# Patient Record
Sex: MALE | Race: WHITE | NOT HISPANIC OR LATINO | Employment: OTHER | URBAN - METROPOLITAN AREA
[De-identification: names, ages, dates, MRNs, and addresses within clinical notes are randomized per-mention and may not be internally consistent; named-entity substitution may affect disease eponyms.]

---

## 2020-10-08 ENCOUNTER — HOSPITAL ENCOUNTER (INPATIENT)
Facility: HOSPITAL | Age: 80
LOS: 7 days | Discharge: NURSING, REHAB OR LONG-TERM CARE | DRG: 177 | End: 2020-10-15
Attending: HOSPITALIST | Admitting: HOSPITALIST
Payer: MEDICARE

## 2020-10-08 DIAGNOSIS — U07.1 COVID-19: ICD-10-CM

## 2020-10-08 DIAGNOSIS — I50.9 CHF (CONGESTIVE HEART FAILURE): ICD-10-CM

## 2020-10-08 DIAGNOSIS — U07.1 COVID-19 VIRUS INFECTION: ICD-10-CM

## 2020-10-08 PROBLEM — J44.9 COPD (CHRONIC OBSTRUCTIVE PULMONARY DISEASE): Status: ACTIVE | Noted: 2020-10-08

## 2020-10-08 PROBLEM — I10 HTN (HYPERTENSION): Status: ACTIVE | Noted: 2020-10-08

## 2020-10-08 PROBLEM — E11.9 TYPE 2 DIABETES MELLITUS: Status: ACTIVE | Noted: 2020-10-08

## 2020-10-08 LAB
ALBUMIN SERPL BCP-MCNC: 2.8 G/DL (ref 3.5–5.2)
ALP SERPL-CCNC: 46 U/L (ref 55–135)
ALT SERPL W/O P-5'-P-CCNC: 16 U/L (ref 10–44)
ANION GAP SERPL CALC-SCNC: 8 MMOL/L (ref 8–16)
ANISOCYTOSIS BLD QL SMEAR: SLIGHT
AST SERPL-CCNC: 26 U/L (ref 10–40)
AV INDEX (PROSTH): 1.13
AV MEAN GRADIENT: 3 MMHG
AV PEAK GRADIENT: 5 MMHG
AV VALVE AREA: 4.88 CM2
AV VELOCITY RATIO: 0.92
BASOPHILS NFR BLD: 0 % (ref 0–1.9)
BILIRUB SERPL-MCNC: 0.2 MG/DL (ref 0.1–1)
BNP SERPL-MCNC: 134 PG/ML (ref 0–99)
BSA FOR ECHO PROCEDURE: 2.26 M2
BUN SERPL-MCNC: 24 MG/DL (ref 8–23)
CALCIUM SERPL-MCNC: 7.7 MG/DL (ref 8.7–10.5)
CHLORIDE SERPL-SCNC: 101 MMOL/L (ref 95–110)
CK SERPL-CCNC: 105 U/L (ref 20–200)
CO2 SERPL-SCNC: 29 MMOL/L (ref 23–29)
CREAT SERPL-MCNC: 1 MG/DL (ref 0.5–1.4)
CRP SERPL-MCNC: 71.4 MG/L (ref 0–8.2)
CV ECHO LV RWT: 0.26 CM
D DIMER PPP IA.FEU-MCNC: 0.76 MG/L FEU
DIFFERENTIAL METHOD: ABNORMAL
DOP CALC AO PEAK VEL: 1.11 M/S
DOP CALC AO VTI: 21.04 CM
DOP CALC LVOT AREA: 4.3 CM2
DOP CALC LVOT DIAMETER: 2.35 CM
DOP CALC LVOT PEAK VEL: 1.02 M/S
DOP CALC LVOT STROKE VOLUME: 102.7 CM3
DOP CALCLVOT PEAK VEL VTI: 23.69 CM
E WAVE DECELERATION TIME: 182.38 MSEC
E/A RATIO: 0.98
E/E' RATIO: 9.78 M/S
ECHO LV POSTERIOR WALL: 0.73 CM (ref 0.6–1.1)
EOSINOPHIL NFR BLD: 0 % (ref 0–8)
ERYTHROCYTE [DISTWIDTH] IN BLOOD BY AUTOMATED COUNT: 15.3 % (ref 11.5–14.5)
ERYTHROCYTE [SEDIMENTATION RATE] IN BLOOD BY WESTERGREN METHOD: 77 MM/HR (ref 0–23)
EST. GFR  (AFRICAN AMERICAN): >60 ML/MIN/1.73 M^2
EST. GFR  (NON AFRICAN AMERICAN): >60 ML/MIN/1.73 M^2
ESTIMATED AVG GLUCOSE: 131 MG/DL (ref 68–131)
FERRITIN SERPL-MCNC: 241 NG/ML (ref 20–300)
FRACTIONAL SHORTENING: 34 % (ref 28–44)
GLUCOSE SERPL-MCNC: 116 MG/DL (ref 70–110)
HBA1C MFR BLD HPLC: 6.2 % (ref 4–5.6)
HCT VFR BLD AUTO: 43.9 % (ref 40–54)
HGB BLD-MCNC: 13.4 G/DL (ref 14–18)
IMM GRANULOCYTES # BLD AUTO: ABNORMAL K/UL (ref 0–0.04)
IMM GRANULOCYTES NFR BLD AUTO: ABNORMAL % (ref 0–0.5)
INTERVENTRICULAR SEPTUM: 0.73 CM (ref 0.6–1.1)
LA MAJOR: 4.9 CM
LA MINOR: 5.37 CM
LA WIDTH: 3.52 CM
LACTATE SERPL-SCNC: 0.9 MMOL/L (ref 0.5–2.2)
LDH SERPL L TO P-CCNC: 217 U/L (ref 110–260)
LEFT ATRIUM SIZE: 2.74 CM
LEFT ATRIUM VOLUME INDEX: 19.2 ML/M2
LEFT ATRIUM VOLUME: 42.01 CM3
LEFT INTERNAL DIMENSION IN SYSTOLE: 3.75 CM (ref 2.1–4)
LEFT VENTRICLE DIASTOLIC VOLUME INDEX: 73.68 ML/M2
LEFT VENTRICLE DIASTOLIC VOLUME: 160.98 ML
LEFT VENTRICLE MASS INDEX: 70 G/M2
LEFT VENTRICLE SYSTOLIC VOLUME INDEX: 27.5 ML/M2
LEFT VENTRICLE SYSTOLIC VOLUME: 60.05 ML
LEFT VENTRICULAR INTERNAL DIMENSION IN DIASTOLE: 5.71 CM (ref 3.5–6)
LEFT VENTRICULAR MASS: 152.38 G
LV LATERAL E/E' RATIO: 8 M/S
LV SEPTAL E/E' RATIO: 12.57 M/S
LYMPHOCYTES NFR BLD: 10 % (ref 18–48)
MAGNESIUM SERPL-MCNC: 1.9 MG/DL (ref 1.6–2.6)
MCH RBC QN AUTO: 28.2 PG (ref 27–31)
MCHC RBC AUTO-ENTMCNC: 30.5 G/DL (ref 32–36)
MCV RBC AUTO: 92 FL (ref 82–98)
METAMYELOCYTES NFR BLD MANUAL: 4 %
MONOCYTES NFR BLD: 4 % (ref 4–15)
MV PEAK A VEL: 0.9 M/S
MV PEAK E VEL: 0.88 M/S
MV STENOSIS PRESSURE HALF TIME: 52.89 MS
MV VALVE AREA P 1/2 METHOD: 4.16 CM2
MYELOCYTES NFR BLD MANUAL: 5 %
NEUTROPHILS NFR BLD: 69 % (ref 38–73)
NEUTS BAND NFR BLD MANUAL: 7 %
NRBC BLD-RTO: 0 /100 WBC
OVALOCYTES BLD QL SMEAR: ABNORMAL
PHOSPHATE SERPL-MCNC: 1.4 MG/DL (ref 2.7–4.5)
PLATELET # BLD AUTO: 178 K/UL (ref 150–350)
PMV BLD AUTO: 9.6 FL (ref 9.2–12.9)
POCT GLUCOSE: 102 MG/DL (ref 70–110)
POCT GLUCOSE: 108 MG/DL (ref 70–110)
POCT GLUCOSE: 126 MG/DL (ref 70–110)
POCT GLUCOSE: 178 MG/DL (ref 70–110)
POCT GLUCOSE: 281 MG/DL (ref 70–110)
POIKILOCYTOSIS BLD QL SMEAR: SLIGHT
POLYCHROMASIA BLD QL SMEAR: ABNORMAL
POTASSIUM SERPL-SCNC: 4.7 MMOL/L (ref 3.5–5.1)
PROCALCITONIN SERPL IA-MCNC: 1.61 NG/ML
PROMYELOCYTES NFR BLD MANUAL: 1 %
PROT SERPL-MCNC: 6.6 G/DL (ref 6–8.4)
RA MAJOR: 4.6 CM
RA PRESSURE: 8 MMHG
RA WIDTH: 3.83 CM
RBC # BLD AUTO: 4.76 M/UL (ref 4.6–6.2)
RIGHT VENTRICULAR END-DIASTOLIC DIMENSION: 3.8 CM
RV TISSUE DOPPLER FREE WALL SYSTOLIC VELOCITY 1 (APICAL 4 CHAMBER VIEW): 11.03 CM/S
SINUS: 3.79 CM
SODIUM SERPL-SCNC: 138 MMOL/L (ref 136–145)
STJ: 3.36 CM
TDI LATERAL: 0.11 M/S
TDI SEPTAL: 0.07 M/S
TDI: 0.09 M/S
TRICUSPID ANNULAR PLANE SYSTOLIC EXCURSION: 2.31 CM
TROPONIN I SERPL DL<=0.01 NG/ML-MCNC: 0.02 NG/ML (ref 0–0.03)
WBC # BLD AUTO: 7.04 K/UL (ref 3.9–12.7)

## 2020-10-08 PROCEDURE — 99900035 HC TECH TIME PER 15 MIN (STAT)

## 2020-10-08 PROCEDURE — 94640 AIRWAY INHALATION TREATMENT: CPT

## 2020-10-08 PROCEDURE — 83605 ASSAY OF LACTIC ACID: CPT

## 2020-10-08 PROCEDURE — 85652 RBC SED RATE AUTOMATED: CPT

## 2020-10-08 PROCEDURE — 83036 HEMOGLOBIN GLYCOSYLATED A1C: CPT

## 2020-10-08 PROCEDURE — 86140 C-REACTIVE PROTEIN: CPT

## 2020-10-08 PROCEDURE — 87040 BLOOD CULTURE FOR BACTERIA: CPT

## 2020-10-08 PROCEDURE — 97535 SELF CARE MNGMENT TRAINING: CPT

## 2020-10-08 PROCEDURE — 82550 ASSAY OF CK (CPK): CPT

## 2020-10-08 PROCEDURE — 93010 EKG 12-LEAD: ICD-10-PCS | Mod: ,,, | Performed by: INTERNAL MEDICINE

## 2020-10-08 PROCEDURE — 27000221 HC OXYGEN, UP TO 24 HOURS

## 2020-10-08 PROCEDURE — 36415 COLL VENOUS BLD VENIPUNCTURE: CPT

## 2020-10-08 PROCEDURE — 94664 DEMO&/EVAL PT USE INHALER: CPT

## 2020-10-08 PROCEDURE — 27000646 HC AEROBIKA DEVICE

## 2020-10-08 PROCEDURE — 84484 ASSAY OF TROPONIN QUANT: CPT

## 2020-10-08 PROCEDURE — 83880 ASSAY OF NATRIURETIC PEPTIDE: CPT

## 2020-10-08 PROCEDURE — 93010 ELECTROCARDIOGRAM REPORT: CPT | Mod: ,,, | Performed by: INTERNAL MEDICINE

## 2020-10-08 PROCEDURE — 63600175 PHARM REV CODE 636 W HCPCS: Performed by: STUDENT IN AN ORGANIZED HEALTH CARE EDUCATION/TRAINING PROGRAM

## 2020-10-08 PROCEDURE — 83615 LACTATE (LD) (LDH) ENZYME: CPT

## 2020-10-08 PROCEDURE — 99223 1ST HOSP IP/OBS HIGH 75: CPT | Mod: AI,GC,, | Performed by: INTERNAL MEDICINE

## 2020-10-08 PROCEDURE — 83735 ASSAY OF MAGNESIUM: CPT

## 2020-10-08 PROCEDURE — 97165 OT EVAL LOW COMPLEX 30 MIN: CPT

## 2020-10-08 PROCEDURE — 97116 GAIT TRAINING THERAPY: CPT

## 2020-10-08 PROCEDURE — 85379 FIBRIN DEGRADATION QUANT: CPT

## 2020-10-08 PROCEDURE — 85027 COMPLETE CBC AUTOMATED: CPT

## 2020-10-08 PROCEDURE — 25000003 PHARM REV CODE 250: Performed by: INTERNAL MEDICINE

## 2020-10-08 PROCEDURE — 20600001 HC STEP DOWN PRIVATE ROOM

## 2020-10-08 PROCEDURE — 85007 BL SMEAR W/DIFF WBC COUNT: CPT

## 2020-10-08 PROCEDURE — 97161 PT EVAL LOW COMPLEX 20 MIN: CPT

## 2020-10-08 PROCEDURE — 82728 ASSAY OF FERRITIN: CPT

## 2020-10-08 PROCEDURE — 84145 PROCALCITONIN (PCT): CPT

## 2020-10-08 PROCEDURE — 80053 COMPREHEN METABOLIC PANEL: CPT

## 2020-10-08 PROCEDURE — 99223 PR INITIAL HOSPITAL CARE,LEVL III: ICD-10-PCS | Mod: AI,GC,, | Performed by: INTERNAL MEDICINE

## 2020-10-08 PROCEDURE — 25000242 PHARM REV CODE 250 ALT 637 W/ HCPCS: Performed by: HOSPITALIST

## 2020-10-08 PROCEDURE — 92610 EVALUATE SWALLOWING FUNCTION: CPT

## 2020-10-08 PROCEDURE — 93005 ELECTROCARDIOGRAM TRACING: CPT

## 2020-10-08 PROCEDURE — 94799 UNLISTED PULMONARY SVC/PX: CPT

## 2020-10-08 PROCEDURE — 94761 N-INVAS EAR/PLS OXIMETRY MLT: CPT

## 2020-10-08 PROCEDURE — 84100 ASSAY OF PHOSPHORUS: CPT

## 2020-10-08 PROCEDURE — 25000003 PHARM REV CODE 250: Performed by: STUDENT IN AN ORGANIZED HEALTH CARE EDUCATION/TRAINING PROGRAM

## 2020-10-08 RX ORDER — GLUCAGON 1 MG
1 KIT INJECTION
Status: DISCONTINUED | OUTPATIENT
Start: 2020-10-08 | End: 2020-10-15 | Stop reason: HOSPADM

## 2020-10-08 RX ORDER — IPRATROPIUM BROMIDE AND ALBUTEROL SULFATE 2.5; .5 MG/3ML; MG/3ML
3 SOLUTION RESPIRATORY (INHALATION) EVERY 6 HOURS
Status: DISCONTINUED | OUTPATIENT
Start: 2020-10-08 | End: 2020-10-15 | Stop reason: HOSPADM

## 2020-10-08 RX ORDER — METFORMIN HYDROCHLORIDE 1000 MG/1
1000 TABLET ORAL 2 TIMES DAILY WITH MEALS
COMMUNITY

## 2020-10-08 RX ORDER — LABETALOL HYDROCHLORIDE 5 MG/ML
10 INJECTION, SOLUTION INTRAVENOUS EVERY 4 HOURS PRN
Status: DISCONTINUED | OUTPATIENT
Start: 2020-10-08 | End: 2020-10-08

## 2020-10-08 RX ORDER — CEFTRIAXONE 1 G/1
1 INJECTION, POWDER, FOR SOLUTION INTRAMUSCULAR; INTRAVENOUS
Status: COMPLETED | OUTPATIENT
Start: 2020-10-09 | End: 2020-10-11

## 2020-10-08 RX ORDER — IBUPROFEN 200 MG
16 TABLET ORAL
Status: DISCONTINUED | OUTPATIENT
Start: 2020-10-08 | End: 2020-10-15 | Stop reason: HOSPADM

## 2020-10-08 RX ORDER — SODIUM CHLORIDE 0.9 % (FLUSH) 0.9 %
10 SYRINGE (ML) INJECTION
Status: DISCONTINUED | OUTPATIENT
Start: 2020-10-08 | End: 2020-10-15 | Stop reason: HOSPADM

## 2020-10-08 RX ORDER — AZITHROMYCIN 250 MG/1
250 TABLET, FILM COATED ORAL DAILY
Status: DISCONTINUED | OUTPATIENT
Start: 2020-10-08 | End: 2020-10-08

## 2020-10-08 RX ORDER — LABETALOL HCL 20 MG/4 ML
10 SYRINGE (ML) INTRAVENOUS EVERY 4 HOURS PRN
Status: DISCONTINUED | OUTPATIENT
Start: 2020-10-08 | End: 2020-10-15 | Stop reason: HOSPADM

## 2020-10-08 RX ORDER — AZITHROMYCIN 250 MG/1
500 TABLET, FILM COATED ORAL ONCE
Status: DISCONTINUED | OUTPATIENT
Start: 2020-10-08 | End: 2020-10-08

## 2020-10-08 RX ORDER — IBUPROFEN 200 MG
24 TABLET ORAL
Status: DISCONTINUED | OUTPATIENT
Start: 2020-10-08 | End: 2020-10-15 | Stop reason: HOSPADM

## 2020-10-08 RX ORDER — PANTOPRAZOLE SODIUM 40 MG/1
40 TABLET, DELAYED RELEASE ORAL DAILY
COMMUNITY

## 2020-10-08 RX ORDER — AZITHROMYCIN 250 MG/1
250 TABLET, FILM COATED ORAL DAILY
Status: COMPLETED | OUTPATIENT
Start: 2020-10-09 | End: 2020-10-12

## 2020-10-08 RX ORDER — PANTOPRAZOLE SODIUM 40 MG/1
40 TABLET, DELAYED RELEASE ORAL DAILY
Status: DISCONTINUED | OUTPATIENT
Start: 2020-10-09 | End: 2020-10-15 | Stop reason: HOSPADM

## 2020-10-08 RX ORDER — ENOXAPARIN SODIUM 100 MG/ML
40 INJECTION SUBCUTANEOUS EVERY 24 HOURS
Status: DISCONTINUED | OUTPATIENT
Start: 2020-10-08 | End: 2020-10-15 | Stop reason: HOSPADM

## 2020-10-08 RX ORDER — HYDROCHLOROTHIAZIDE 25 MG/1
25 TABLET ORAL DAILY
Status: ON HOLD | COMMUNITY
End: 2020-10-15 | Stop reason: SDUPTHER

## 2020-10-08 RX ORDER — PANTOPRAZOLE SODIUM 40 MG/1
40 TABLET, DELAYED RELEASE ORAL DAILY
Status: DISCONTINUED | OUTPATIENT
Start: 2020-10-08 | End: 2020-10-08

## 2020-10-08 RX ORDER — AZITHROMYCIN 250 MG/1
250 TABLET, FILM COATED ORAL DAILY
Status: DISCONTINUED | OUTPATIENT
Start: 2020-10-09 | End: 2020-10-08

## 2020-10-08 RX ORDER — INSULIN ASPART 100 [IU]/ML
0-5 INJECTION, SOLUTION INTRAVENOUS; SUBCUTANEOUS
Status: DISCONTINUED | OUTPATIENT
Start: 2020-10-08 | End: 2020-10-15 | Stop reason: HOSPADM

## 2020-10-08 RX ORDER — CEFTRIAXONE 1 G/1
1 INJECTION, POWDER, FOR SOLUTION INTRAMUSCULAR; INTRAVENOUS
Status: COMPLETED | OUTPATIENT
Start: 2020-10-08 | End: 2020-10-08

## 2020-10-08 RX ORDER — ASPIRIN 81 MG/1
81 TABLET ORAL DAILY
COMMUNITY

## 2020-10-08 RX ADMIN — SODIUM PHOSPHATE, MONOBASIC, MONOHYDRATE AND SODIUM PHOSPHATE, DIBASIC, ANHYDROUS 30 MMOL: 276; 142 INJECTION, SOLUTION INTRAVENOUS at 09:10

## 2020-10-08 RX ADMIN — REMDESIVIR 100 MG: 100 INJECTION, POWDER, LYOPHILIZED, FOR SOLUTION INTRAVENOUS at 04:10

## 2020-10-08 RX ADMIN — INSULIN ASPART 3 UNITS: 100 INJECTION, SOLUTION INTRAVENOUS; SUBCUTANEOUS at 12:10

## 2020-10-08 RX ADMIN — Medication 10 MG: at 10:10

## 2020-10-08 RX ADMIN — CEFTRIAXONE SODIUM 1 G: 1 INJECTION, POWDER, FOR SOLUTION INTRAMUSCULAR; INTRAVENOUS at 11:10

## 2020-10-08 RX ADMIN — IPRATROPIUM BROMIDE AND ALBUTEROL SULFATE 3 ML: .5; 2.5 SOLUTION RESPIRATORY (INHALATION) at 07:10

## 2020-10-08 RX ADMIN — IPRATROPIUM BROMIDE AND ALBUTEROL SULFATE 3 ML: .5; 2.5 SOLUTION RESPIRATORY (INHALATION) at 12:10

## 2020-10-08 RX ADMIN — ENOXAPARIN SODIUM 40 MG: 40 INJECTION SUBCUTANEOUS at 04:10

## 2020-10-08 RX ADMIN — DEXAMETHASONE SODIUM PHOSPHATE 6 MG: 4 INJECTION, SOLUTION INTRAMUSCULAR; INTRAVENOUS at 10:10

## 2020-10-08 RX ADMIN — CEFTRIAXONE SODIUM 1 G: 1 INJECTION, POWDER, FOR SOLUTION INTRAMUSCULAR; INTRAVENOUS at 07:10

## 2020-10-08 RX ADMIN — AZITHROMYCIN MONOHYDRATE 500 MG: 500 INJECTION, POWDER, LYOPHILIZED, FOR SOLUTION INTRAVENOUS at 09:10

## 2020-10-08 NOTE — PT/OT/SLP EVAL
Speech Language Pathology Evaluation  Bedside Swallow    Patient Name:  Femi Beck   MRN:  [id number]  Admitting Diagnosis: <principal problem not specified>    Recommendations:                 General Recommendations:  Dysphagia therapy  Diet recommendations:  Mechanical soft, Thin   Aspiration Precautions: 1 bite/sip at a time, Meds whole 1 at a time, Small bites/sips and Standard aspiration precautions   General Precautions: Standard, fall  Communication strategies:  Pt hard of hearing     History:     Past Medical History:   Diagnosis Date    COPD (chronic obstructive pulmonary disease)     Diabetes mellitus     Diverticulosis     GI bleed     associated w/ diverticulosis    Hypertension        Past Surgical History:   Procedure Laterality Date    CATARACT EXTRACTION         Modified Barium Swallow: none prior     Subjective     Pt awake and seated upright in bed      Pain/Comfort:  · Pain Rating 1: 0/10  · Pain Rating Post-Intervention 1: 0/10    Objective:     Oral Musculature Evaluation  · Oral Musculature: WFL  · Dentition: edentulous  · Secretion Management: adequate  · Oral Labial Strength and Mobility: WFL  · Lingual Strength and Mobility: WFL  · Volitional Cough: weak  · Volitional Swallow: difficult to elicit  · Voice Prior to PO Intake: hoarse but clear    Bedside Swallow Eval:   Consistencies Assessed:  · Thin liquids small single sips across 3oz, sonceutive sips from open cup and straw   · Puree 4oz via full tsp  · Solids x3     Oral Phase:   · WFL   · Pt very impulsive eating style taking large bites/sips, difficult to regulate given significantly hard of hearing     Pharyngeal Phase:   · coughing/choking  · throat clearing following large consecutive sips of thin liquids   · With return to small single sips no overt clinical signs of aspiration appreciated     Compensatory Strategies  · None      Treatment: Pt significantly hard of hearing at baseline. SLP attempted multiple times to  education pt re: diet recommendations and aspiration precautions. No true learning appreciated and ongoing reinforcement warranted. Whiteboard current     Assessment:     Femi Beck is a 79 y.o. male with an SLP diagnosis of Dysphagia.  Goals:   Multidisciplinary Problems     SLP Goals        Problem: SLP Goal    Goal Priority Disciplines Outcome   SLP Goal     SLP    Description: Speech Language Pathology Goals  Goals expected to be met by 10/15    1. Pt will tolerate diet of mechanical soft solids and thin liquids without overt clinical signs of aspiration                      Plan:     · Patient to be seen:  4 x/week   · Plan of Care expires:     · Plan of Care reviewed with:  patient   · SLP Follow-Up:  Yes       Discharge recommendations:  nursing facility, skilled   Barriers to Discharge:  None    Time Tracking:     SLP Treatment Date:   10/08/20  Speech Start Time:  1101  Speech Stop Time:  1117     Speech Total Time (min):  16 min    Billable Minutes: Niall Solomon  and Carin Care/Home Management Training 8    Rachel Angulo CCC-SLP  10/08/2020            Principal Discharge DX:	Generalized abdominal pain

## 2020-10-08 NOTE — PLAN OF CARE
Pt seen for clinical swallow assessment appearing safe for MS/thin liquids     Rachel Angulo MS, CCC-SLP  Speech Language Pathologist  Pager: [phone]  Date 10/8/2020

## 2020-10-08 NOTE — NURSING
Pt arrives to unit via [facility] from [facility]. Pt arrives with venti mask @ 15 L. Pt short of breath at rest and with exertion. Pt denies fever/chills. Pt unable to speak in full and complete sentences without becoming short of breath. Work of breathing unlabored at rest. Labored with movement and with movement accessory muscle use. Pts abdomen is rounded / distended with edema + 1 noted. +1 edema noted to bilateral upper and lower extremities. Pt with generalized weakness and requires assistance to turn and reposition. Pt alert and oriented and is able to answer name, date of birth, time, and place without difficulty.  Pt with decreased hearing to R and L ear. Pt with decreased vision. Condom cath in  Place. Assessment done per flowsheet. NAD noted. Awaiting MD orders/ disposition. Bed rails up x 2 with bed locked in lowest position. Call light in reach. Will continue to monitor. ADMIT evaluation continues.

## 2020-10-08 NOTE — PT/OT/SLP EVAL
"Occupational Therapy   Evaluation    Name: Femi Beck  MRN: [id number]  Admitting Diagnosis:  COVID    Recommendations:     Discharge Recommendations: nursing facility, skilled  Discharge Equipment Recommendations:  (TBD)  Barriers to discharge:  (Unknown)    Assessment:     Femi Beck is a 79 y.o. male with a medical diagnosis of COVID.  He presents as a transfer from [place] [address]. Performance deficits affecting function: weakness, impaired functional mobilty, impaired cognition, decreased safety awareness, impaired cardiopulmonary response to activity, impaired endurance, gait instability, impaired balance, impaired self care skills.      Rehab Prognosis: Good; patient would benefit from acute skilled OT services to address these deficits and reach maximum level of function.       Plan:     Patient to be seen 3 x/week to address the above listed problems via self-care/home management, therapeutic activities, therapeutic exercises  · Plan of Care Expires: 11/07/20  · Plan of Care Reviewed with: patient    Subjective     Chief Complaint: "I only got here 5 hours ago."  Patient/Family Comments/goals: to get better and go home    Occupational Profile:  Per CM note:  Mr Beck Lives in a single family mobile home with his son and grandson. There are 2 steps to porch and point of entry. Prior to this hospitalization Mr Beck was independent with ADLs / drove / DOES NOT use DME or other in home assistive equipment.    Pain/Comfort:  · Pain Rating 1: 0/10  · Pain Rating Post-Intervention 1: 0/10    Patients cultural, spiritual, Hinduism conflicts given the current situation: no    Objective:     Communicated with: RN prior to session.  Patient found supine with pulse ox (continuous), telemetry, peripheral IV, oxygen(10L HFNC) upon OT entry to room.    General Precautions: Standard, airborne, contact, droplet, fall, hearing impaired   Orthopedic Precautions:    Braces:       Occupational " Performance:    Bed Mobility:    · Patient completed Supine to Sit with contact guard assistance  · Patient completed Sit to Supine with contact guard assistance    Functional Mobility/Transfers:  · Patient completed Sit <> Stand Transfer with contact guard assistance  with  hand-held assist   · Functional Mobility: CGA around room with B HHA    Activities of Daily Living:  · Feeding:  minimum assistance    · Grooming: minimum assistance    · Upper Body Dressing: moderate assistance    · Lower Body Dressing: maximal assistance      Cognitive/Visual Perceptual:  Pt is alert and following commands via gesturing and when pt is able to understand therapist's command (pt is very [place])  Difficult to assess orientation 2* [place], but RN reports pt was A&Ox3    Physical Exam:  Postural examination/scapula alignment:    -       No postural abnormalities identified  Sensation:    -       Intact  Upper Extremity Range of Motion:     -       Right Upper Extremity: WFL  -       Left Upper Extremity: WFL  Upper Extremity Strength:    -       Right Upper Extremity: WFL  -       Left Upper Extremity: WFL   Strength:    -       Right Upper Extremity: WFL  -       Left Upper Extremity: WFL  Fine Motor Coordination:    -       Intact  Gross motor coordination:   WFL    AMPAC 6 Click ADL:  AMPAC Total Score: 14    Treatment & Education:  Pt ed on OT POC  Pt ed on safety with transfers and ADL performance with need for assistance to complete  Pt ed on ROM ex's 3x daily for increased overall strength and endurance  Education:  RN requested return to bed    Patient left with bed in chair position with all lines intact, call button in reach and RN notified    GOALS:   Multidisciplinary Problems     Occupational Therapy Goals        Problem: Occupational Therapy Goal    Goal Priority Disciplines Outcome Interventions   Occupational Therapy Goal     OT, PT/OT Ongoing, Progressing    Description: Goals to be met by: 10/22/20     Patient will  increase functional independence with ADLs by performing:    Feeding with Modified [place].  UE Dressing with Supervision.  LE Dressing with Stand-by Assistance.  Grooming while standing at sink with Stand-by Assistance.  Toileting from toilet with Stand-by Assistance for hygiene and clothing management.   Toilet transfer to toilet with Stand-by Assistance.                     History:     Past Medical History:   Diagnosis Date    COPD (chronic obstructive pulmonary disease)     Diabetes mellitus     Diverticulosis     GI bleed     associated w/ diverticulosis    Hypertension        Past Surgical History:   Procedure Laterality Date    CATARACT EXTRACTION         Time Tracking:     OT Date of Treatment: 10/08/20  OT Start Time: 0846  OT Stop Time: 0906  OT Total Time (min): 20 min    Billable Minutes:Evaluation 10  Self Care/Home Management 10    BRYAN Hinoojsa  10/8/2020

## 2020-10-08 NOTE — PLAN OF CARE
No acute events during shift. Pt tolerated all activities well. Pt voiding with no difficulties per urinal. Pt worked with PT today and stood at the side of the bed. Pt participated with all therapies. Granddaughter was updated and number was placed in the chart. All questions answered. Will endorse care to NOC RN.

## 2020-10-08 NOTE — H&P
"[facility] ICU [facility] Medicine  History & Physical    Patient Name: Femi Beck  MRN: [id number]  Admission Date: 10/8/2020  Attending Physician: Andie Hernandez MD   Primary Care Provider: Primary Doctor No         Patient information was obtained from past medical records.     Subjective:     Principal Problem:<principal problem not specified>    Chief Complaint: No chief complaint on file.     HPI: Mr. Beck is an 79 year-old male with COPD, HTN, DM2, and extremely hard of hearing who is presenting to [facility] [place] a "[place]" transfer. Of note, the entire history was obtained from the patient's physical paper chart, as the patient is extremely hard of hearing. He presented to [facility] on 10/[facility] with encephalopathy and cough. On arrival there he was normotensive, tachycardic, mildly febrile (100.6), nontachypneic, and with O2 saturations at 88% on room air. He was placed on supplemental O2 for improvement. Labs were significant for a leukocytosis 14.8, an TASHA (creat 1.4, unclear baseline), hyponatremia 128, , lactate 2.0 which trended down to 1.5, trop 0.02, and elevated inflammatory markers (CRP 33, ferritin 129, procal 5.1, ). He tested negative for Influenza A+B. A CXR revealed basal interstitial changes and no evidence of consolidation. He was admitted to the ICU and started on cefepime and azithromycin for CAP, dexamethasone, remdesivir, and was additionally given convalescent plasma. Per records the AMS, leukocytosis, TASHA, and hyponatremia improved throughout his stay.     Past Medical History:   Diagnosis Date    COPD (chronic obstructive pulmonary disease)     Diabetes mellitus     Diverticulosis     GI bleed     associated w/ diverticulosis    Hypertension      Past Surgical History:   Procedure Laterality Date    CATARACT EXTRACTION       Review of patient's allergies indicates:  No Known Allergies    No current facility-administered " medications on file prior to encounter.      Current Outpatient Medications on File Prior to Encounter   Medication Sig    aspirin (ECOTRIN) 81 MG EC tablet Take 81 mg by mouth once daily.    hydroCHLOROthiazide (HYDRODIURIL) 25 MG tablet Take 25 mg by mouth once daily.    metFORMIN (GLUCOPHAGE) 1000 MG tablet Take 1,000 mg by mouth 2 (two) times daily with meals.    pantoprazole (PROTONIX) 40 MG tablet Take 40 mg by mouth once daily.     Family History     None        Tobacco Use    Smoking status: Not on file   Substance and Sexual Activity    Alcohol use: Not on file    Drug use: Not on file    Sexual activity: Not on file     Review of Systems   Reason unable to perform ROS: HARD OF HEARING.     Objective:     Vital Signs (Most Recent):  Temp: 98.5 °F (36.9 °C) (10/08/20 0413)  Pulse: 71 (10/08/20 0421)  Resp: 20 (10/08/20 0413)  BP: (!) 141/69 (10/08/20 0413)  SpO2: 96 % (10/08/20 0421) Vital Signs (24h Range):  Temp:  [98.5 °F (36.9 °C)] 98.5 °F (36.9 °C)  Pulse:  [69-82] 71  Resp:  [17-20] 20  SpO2:  [88 %-96 %] 96 %  BP: (116-141)/(63-69) 141/69        There is no height or weight on file to calculate BMI.    Physical Exam  Constitutional:       General: He is not in acute distress.     Appearance: Normal appearance. He is obese. He is not ill-appearing, toxic-appearing or diaphoretic.   HENT:      Head: Normocephalic and atraumatic.      Nose: No congestion.      Mouth/Throat:      Pharynx: No oropharyngeal exudate or posterior oropharyngeal erythema.   Eyes:      General: No scleral icterus.     Extraocular Movements: Extraocular movements intact.      Conjunctiva/sclera: Conjunctivae normal.      Pupils: Pupils are equal, round, and reactive to light.   Cardiovascular:      Rate and Rhythm: Normal rate and regular rhythm.   Pulmonary:      Effort: Pulmonary effort is normal.      Breath sounds: Wheezing (bilateral expiratory wheezes) present.      Comments: Sitting upright, speaking outloud in  clear and complete sentences, in no acute distress  Abdominal:      General: Abdomen is flat. Bowel sounds are normal. There is no distension.      Tenderness: There is no abdominal tenderness. There is no guarding.   Musculoskeletal:         General: Swelling (trace pedal edema extending to knees bilaterally) present.   Skin:     General: Skin is warm.      Findings: No bruising, erythema or rash.   Neurological:      General: No focal deficit present.      Mental Status: He is alert.      Comments: Unable to assess orientation because pt is hard of hearing and could not communicate with me  -However, he is moving all four extremities with adequate strength           CRANIAL NERVES     CN III, IV, VI   Pupils are equal, round, and reactive to light.    Significant Labs: labs from the outside facility (located on paper chart) reviewed. no labs drawn yet at [facility]     Significant Imaging: imaging reads (particularly CXR) from OSH reviewed. No formal imaging CDs or actual images upon transfer.     Assessment/Plan:     COVID-19 virus infection  Mr. Beck is an 79 year-old male with COPD, HTN, DM2 [place] [facility] [place] He presented 10/05 with AMS and cough and tested positive for COVID-19.   -He was septic (met 3/4 SIRS criteria) with hyponatremia (128), an elevated lactate (2.0 on admission which trended down to 1.4), and an TASHA (1.4 on admission, uncertain of baseline)     -CXR read from OSH records: low-grade nonspecific basal pulmonary interstitial changes. No consolidation, mass, pleural effusion, or pneumothorax.     -Per paper chart review, he was started on CAP coverage with cefepime and azithromycin. He additionally received convalescent plasma and was started on dexamethasone and remdesivir     Plan:  -Airborne and droplet precautions  -Ordered CXR-- if evidence of infiltrates can start on CAP coverage but would hold if no infiltrates are  present  -Start dexamethasone given O2 requirements  -Would qualify for remdesivir; pharm consult placed  -Follow cultures  -Ordered TTE in the setting of elevated BNP, follow results    HTN (hypertension)  On HCTZ at home.    Plan:  -Hold in the setting of sepsis  -Resume when clinically indicated    COPD (chronic obstructive pulmonary disease)  No PFTs on hospital transfer record.   -On home duonebs as needed   -Bl expiratory wheezing on exam    Plan:  -Schedule duonebs q6hrs    Type 2 diabetes mellitus  On metformin 1000mg bid at home.   Unclear if insulin naive or not?     Plan:  -Will start on SSI and adjust insulin based on SS requirements  -POCT bg tid wm and qHS    VTE Risk Mitigation (From admission, onward)         Ordered     IP VTE HIGH RISK PATIENT  Once      10/08/20 0421     Place sequential compression device  Until discontinued      10/08/20 0421                 Theresa Corrigan MD  Department of Hospital Medicine   [facility]

## 2020-10-08 NOTE — NURSING
Pt desat to 84% on 02 nasal cannula. May switch pt to comfort flow per respiratory. Pt with skin color change ; face purple and blue / lower extremities dusky. Pt gasping during minimal exertion.       Sacral pad and heel dressings applied. Pt refusing condom catheter. Pt with continuous incontinent dribble. Pt attempting to get out of bed. Pt with decreased hearing. Pt unable to follow verbal commands to stay in bed. Pt with lower ext weakness.

## 2020-10-08 NOTE — PLAN OF CARE
This  obtained number for Lucille Padron (Grand-daughter [phone]) to complete discharge planning assessment. Per Lucille, Mr Beck Lives in a single family mobile home with his son and grandson. There are 2 steps to porch and point of entry. Prior to this hospitalization Mr Beck was independent with ADLs / drove / DOES NOT use DME or other in home assistive equipment. He is not on Dialysis or a blood thinner. He has resources available for all daily and prescriptive needs. Upon discharge, he will return home with Son where he will have his help and help from other immediate family that are available. Mr Beck was evacuated from [facility] ahead of [place]. Questions answered, Unit number [phone] provided /  direct contact number provided. Will continue to follow for course of hospitalization.    10/8/2020  4:05 AM   COVID-19 virus infection [U07.1]        PCP:  Dr Lili Antoine  [address] [place] [address] [phone]    Pharmacy:  [facility]  [address] [place] [address]   [phone]    Emergency Contacts:  Extended Emergency Contact Information  Primary Emergency Contact: Lucille Padron  Mobile Phone: [phone]  Relation: Grandchild  Preferred language: English    Insurance:  Payor: MEDICARE / Plan: MEDICARE PART A & B / Product Type: Government /       Olivia Ponce RN  Case Management  Ext: [phone]          10/08/20 1334   Discharge Assessment   Assessment Type Discharge Planning Assessment   Confirmed/corrected address and phone number on facesheet? Yes   Assessment information obtained from? Caregiver  (Lucille Padron (grand-daughter) [phone])   Expected Length of Stay (days) 5   Communicated expected length of stay with patient/caregiver yes   Prior to hospitilization cognitive status: Alert/Oriented;No Deficits   Prior to hospitalization functional status: Needs Assistance;Independent   Current cognitive status: Alert/Oriented    Current Functional Status: Independent;Needs Assistance  (TBD)   Facility Arrived From: [facility]   Lives With grandchild(carloz);child(carloz), adult  (TBD)   Able to Return to Prior Arrangements yes   Is patient able to care for self after discharge? Yes   Who are your caregiver(s) and their phone number(s)? Lucille Padron (grand-daughter) [phone]   Patient's perception of discharge disposition home or selfcare   Patient currently being followed by outpatient case management? No   Patient currently receives any other outside agency services? No   Equipment Currently Used at Home none  (UNABLE TO DETERMINE)   Do you have any problems affording any of your prescribed medications? No   Is the patient taking medications as prescribed? yes   Does the patient have transportation home? Yes   Transportation Anticipated family or friend will provide   Dialysis Name and Scheduled days N/A   Does the patient receive services at the Coumadin Clinic? No   Discharge Plan A Home   Discharge Plan B Home with family   DME Needed Upon Discharge  other (see comments)  (TBD)   Patient/Family in Agreement with Plan yes

## 2020-10-08 NOTE — CARE UPDATE
Remdesivir FDA EUA Verbal Consent  The patient or parent/caregiver has been provided with the remdesivir Fact Sheet for Patients and Parents/Caregivers and has been counseled that the FDA has authorized the emergency use of remdesivir, which is not an FDA approved drug. The significant known and potential risks and benefits are unknown. The patient or parent/caregiver has been given the option to accept or refuse and has verbally agreed to receive remdesivir. Daily labs will be ordered and monitoring for Serious Adverse Events will be performed.      Baldemar Louis MD  Anesthesiology Resident Physician, PGY-1  [email or web address]  Pager: [phone]

## 2020-10-08 NOTE — ASSESSMENT & PLAN NOTE
Mr. Beck is an 79 year-old male with COPD, HTN, DM2 presenting to [facility] [place] a [place] from [facility] ([place], [place]). He presented 10/05 with AMS and cough and tested positive for COVID-19.   -He was septic (met 3/4 SIRS criteria) with hyponatremia (128), an elevated lactate (2.0 on admission which trended down to 1.4), and an TASHA (1.4 on admission, uncertain of baseline)     -CXR read from OSH records: low-grade nonspecific basal pulmonary interstitial changes. No consolidation, mass, pleural effusion, or pneumothorax.     -Per paper chart review, he was started on CAP coverage with cefepime and azithromycin. He additionally received convalescent plasma and was started on dexamethasone and remdesivir     Plan:  -Airborne and droplet precautions  -Ordered CXR-- if evidence of infiltrates can start on CAP coverage but would hold if no infiltrates are present  -Start dexamethasone given O2 requirements  -Would qualify for remdesivir; pharm consult placed  -Follow cultures  -Ordered TTE in the setting of elevated BNP, follow results

## 2020-10-08 NOTE — PLAN OF CARE
PCP:  Dr Lili Antoine  [address] [place] [address]   [phone]     Pharmacy:  [facility]  [address] [place] [address]   [phone]          Olivia Ponce RN, CM  Case Management  Ext [phone]

## 2020-10-08 NOTE — ASSESSMENT & PLAN NOTE
On metformin 1000mg bid at home.   Unclear if insulin naive or not?     Plan:  -Will start on SSI and adjust insulin based on SS requirements  -POCT bg tid wm and qHS

## 2020-10-08 NOTE — PLAN OF CARE
Problem: Physical Therapy Goal  Goal: Physical Therapy Goal  Description: Goals to be met by: 10/18/20     Patient will increase functional independence with mobility by performin. Supine to sit with Modified [place]  2. Sit to supine with Modified [place]  3. Sit to stand transfer with Stand-by Assistance  4. Gait  x 50 feet with Stand-by Assistance using LRAD, if needed.   5. Ascend/descend 2 stairs with bilateral Handrails Stand-by Assistance.     Outcome: Ongoing, Progressing   Initial evaluation completed. Patient tolerated assessment well. Established POC and goals. Patient would continue to benefit from skilled PT services in order to improve functional mobility independence.   Ivy Donovan, PT, DPT  10/8/2020

## 2020-10-08 NOTE — PT/OT/SLP EVAL
Physical Therapy Evaluation    Patient Name:  Femi Beck   MRN:  [id number]    Recommendations:     Discharge Recommendations:  nursing facility, skilled   Discharge Equipment Recommendations: (TBD pending progress)   Barriers to discharge: increased skilled assistance needed     Assessment:     Femi Beck is a 79 y.o. male admitted with a medical diagnosis of <principal problem not specified>.  He presents with the following impairments/functional limitations:  weakness, impaired endurance, impaired self care skills, impaired functional mobilty, gait instability, impaired balance, decreased coordination, decreased safety awareness, impaired cardiopulmonary response to activity . Patient tolerated session well. Functionally, he was mildly usteady. Patient is significantly [place].  Patient will benefit from PT services to address the mentioned deficits in order to promote an improve functional mobility status. Upon d/c rec of SNF in order for patient to progress towards an improved level of functional mobility independence.     Patient on 10L O2 throughout with saturations between 85-90%.     Rehab Prognosis: Good; patient would benefit from acute skilled PT services to address these deficits and reach maximum level of function.    Recent Surgery: * No surgery found *      Plan:     During this hospitalization, patient to be seen 3 x/week to address the identified rehab impairments via therapeutic activities, therapeutic exercises, neuromuscular re-education, gait training and progress toward the following goals:    · Plan of Care Expires:  11/07/20    History:     Past Medical History:   Diagnosis Date    COPD (chronic obstructive pulmonary disease)     Diabetes mellitus     Diverticulosis     GI bleed     associated w/ diverticulosis    Hypertension        Past Surgical History:   Procedure Laterality Date    CATARACT EXTRACTION         Subjective     Chief Complaint: none   Patient/Family Comments/goals: to  go home  Pain/Comfort:  · Pain Rating 1: 0/10  · Pain Rating Post-Intervention 1: 0/10    Patients cultural, spiritual, Anabaptism conflicts given the current situation: no    Living Environment:  Patient's living environment is as follows: Obtained from chart review - patient [place]   Home type Mobile home    1 or 2 stories  [facility]    Number of ALLY/ rails 2 ALLY   AD used?/Owned?  None    Bathroom set-up  Tub/shower   Working? no   Driving? yes   Individuals living with patient:  Son and grandson   Hobbies/Roles: None stated    Prior to admission, patients level of function was (I) for ADLs and mobility with no DME used.  Equipment used at home: none.  DME owned (not currently used): none.  Upon discharge, patient will have assistance from family.    Objective:     Communicated with RN prior to session.  Patient found HOB elevated with peripheral IV, telemetry, pulse ox (continuous)  upon PT entry to room. See below for detailed functional assessment. Patient agreeable to participate in initial evaluation.    General Precautions: Standard, fall, airborne, droplet, contact   Orthopedic Precautions:N/A   Braces: N/A     Exams:  · Cognitive Exam:  Patient is oriented to Person and Time  · Postural Exam:  Patient presented with the following abnormalities:    · -       Rounded shoulders  · -       Forward head  · Sensation:    LEFT LE: -       Intact  light/touch LE RIGHT LE:-       Intact  light/touch LE      ROM and Strength   Right Lower Extremity  Left Lower Extremity    Hip Flexion WFL WFL   Knee Extension  WFL WFL   Knee Flexion  WFL WFL   Ankle DF WFL WFL   Ankle PF  WFL WFL     Functional Mobility:  · Bed Mobility:     · Rolling Left:  contact guard assistance  · Scooting: contact guard assistance  · Supine to Sit: contact guard assistance  · Sit to Supine: contact guard assistance  · Transfers:     · Sit to Stand:  contact guard assistance with hand-held assist  · Gait: 45ft with CGA and HHA  ·  mildly usteady, no  LOB  ·  narrow HAYDEN, decreased stride length bilaterally  ·  vc's for directional changes, FFP   · Balance: sitting EOB - SBA; static standing - CGA; dynamic standing - CGA     Therapeutic Activities and Exercises:  -Patient educated on the role and goal of PT services during acute care LOS. Question and concerns acknowledged and answered as appropriate.   -Will continue to educated as needed.    -White board updated in patients room to reflect level of assistance needed with nursing. RNx1 - Luis    AM-PAC 6 CLICK MOBILITY  Total Score:18     Patient left with bed in chair position with all lines intact, call button in reach, bed alarm on and RN notified.  White board updated in patient room to reflect level of function with nursing.     GOALS:   Multidisciplinary Problems     Physical Therapy Goals        Problem: Physical Therapy Goal    Goal Priority Disciplines Outcome Goal Variances Interventions   Physical Therapy Goal     PT, PT/OT Ongoing, Progressing     Description: Goals to be met by: 10/18/20     Patient will increase functional independence with mobility by performin. Supine to sit with Modified [place]  2. Sit to supine with Modified [place]  3. Sit to stand transfer with Stand-by Assistance  4. Gait  x 50 feet with Stand-by Assistance using LRAD, if needed.   5. Ascend/descend 2 stairs with bilateral Handrails Stand-by Assistance.                      Time Tracking:     PT Received On: 10/08/20  PT Start Time: 848     PT Stop Time: 907  PT Total Time (min): 19 min     Billable Minutes: Evaluation 10 and Gait Training 9      Ivy Donovan, PT  10/08/2020

## 2020-10-08 NOTE — NURSING
telesitter at bedside. Pt screaming and attempting to get out of bed. Pt uncooperative with xr technician. Pt with decreased hearing and unable to follow commands. Urine saturated sheets. Full linen change and hygiene measures performed.

## 2020-10-08 NOTE — PLAN OF CARE
Problem: Occupational Therapy Goal  Goal: Occupational Therapy Goal  Description: Goals to be met by: 10/22/20     Patient will increase functional independence with ADLs by performing:    Feeding with Modified [place].  UE Dressing with Supervision.  LE Dressing with Stand-by Assistance.  Grooming while standing at sink with Stand-by Assistance.  Toileting from toilet with Stand-by Assistance for hygiene and clothing management.   Toilet transfer to toilet with Stand-by Assistance.    Outcome: Ongoing, Progressing   OT eval completed, and above goals established. BRYAN Hinojosa  10/8/2020

## 2020-10-08 NOTE — CARE UPDATE
Rapid Response Nurse Chart Check     Chart check completed. Please call [phone] for further concerns or assistance.
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
## 2020-10-08 NOTE — ASSESSMENT & PLAN NOTE
No PFTs on hospital transfer record.   -On home duonebs as needed   -Bl expiratory wheezing on exam    Plan:  -Schedule duonebs q6hrs

## 2020-10-09 PROBLEM — K21.9 GERD (GASTROESOPHAGEAL REFLUX DISEASE): Chronic | Status: ACTIVE | Noted: 2020-10-09

## 2020-10-09 LAB
ALBUMIN SERPL BCP-MCNC: 2.8 G/DL (ref 3.5–5.2)
ALP SERPL-CCNC: 43 U/L (ref 55–135)
ALT SERPL W/O P-5'-P-CCNC: 18 U/L (ref 10–44)
ANION GAP SERPL CALC-SCNC: 11 MMOL/L (ref 8–16)
ANISOCYTOSIS BLD QL SMEAR: SLIGHT
AST SERPL-CCNC: 25 U/L (ref 10–40)
BASOPHILS NFR BLD: 0 % (ref 0–1.9)
BILIRUB SERPL-MCNC: 0.2 MG/DL (ref 0.1–1)
BUN SERPL-MCNC: 23 MG/DL (ref 8–23)
CALCIUM SERPL-MCNC: 7.8 MG/DL (ref 8.7–10.5)
CHLORIDE SERPL-SCNC: 100 MMOL/L (ref 95–110)
CO2 SERPL-SCNC: 31 MMOL/L (ref 23–29)
CREAT SERPL-MCNC: 1 MG/DL (ref 0.5–1.4)
DIFFERENTIAL METHOD: ABNORMAL
EOSINOPHIL NFR BLD: 0 % (ref 0–8)
ERYTHROCYTE [DISTWIDTH] IN BLOOD BY AUTOMATED COUNT: 15 % (ref 11.5–14.5)
EST. GFR  (AFRICAN AMERICAN): >60 ML/MIN/1.73 M^2
EST. GFR  (NON AFRICAN AMERICAN): >60 ML/MIN/1.73 M^2
GLUCOSE SERPL-MCNC: 147 MG/DL (ref 70–110)
HCT VFR BLD AUTO: 45.7 % (ref 40–54)
HGB BLD-MCNC: 13.7 G/DL (ref 14–18)
IMM GRANULOCYTES # BLD AUTO: ABNORMAL K/UL (ref 0–0.04)
IMM GRANULOCYTES NFR BLD AUTO: ABNORMAL % (ref 0–0.5)
LYMPHOCYTES NFR BLD: 19 % (ref 18–48)
MAGNESIUM SERPL-MCNC: 1.9 MG/DL (ref 1.6–2.6)
MCH RBC QN AUTO: 27.6 PG (ref 27–31)
MCHC RBC AUTO-ENTMCNC: 30 G/DL (ref 32–36)
MCV RBC AUTO: 92 FL (ref 82–98)
METAMYELOCYTES NFR BLD MANUAL: 2 %
MONOCYTES NFR BLD: 12 % (ref 4–15)
MYELOCYTES NFR BLD MANUAL: 2 %
NEUTROPHILS NFR BLD: 60 % (ref 38–73)
NEUTS BAND NFR BLD MANUAL: 5 %
NRBC BLD-RTO: 0 /100 WBC
PHOSPHATE SERPL-MCNC: 1.8 MG/DL (ref 2.7–4.5)
PLATELET # BLD AUTO: 186 K/UL (ref 150–350)
PLATELET BLD QL SMEAR: ABNORMAL
PMV BLD AUTO: 9.4 FL (ref 9.2–12.9)
POCT GLUCOSE: 144 MG/DL (ref 70–110)
POCT GLUCOSE: 147 MG/DL (ref 70–110)
POCT GLUCOSE: 189 MG/DL (ref 70–110)
POTASSIUM SERPL-SCNC: 4.6 MMOL/L (ref 3.5–5.1)
PROT SERPL-MCNC: 6.5 G/DL (ref 6–8.4)
RBC # BLD AUTO: 4.96 M/UL (ref 4.6–6.2)
SODIUM SERPL-SCNC: 142 MMOL/L (ref 136–145)
WBC # BLD AUTO: 4.72 K/UL (ref 3.9–12.7)

## 2020-10-09 PROCEDURE — 94799 UNLISTED PULMONARY SVC/PX: CPT

## 2020-10-09 PROCEDURE — 27100171 HC OXYGEN HIGH FLOW UP TO 24 HOURS

## 2020-10-09 PROCEDURE — 25000242 PHARM REV CODE 250 ALT 637 W/ HCPCS: Performed by: HOSPITALIST

## 2020-10-09 PROCEDURE — 63600175 PHARM REV CODE 636 W HCPCS: Performed by: STUDENT IN AN ORGANIZED HEALTH CARE EDUCATION/TRAINING PROGRAM

## 2020-10-09 PROCEDURE — 27000221 HC OXYGEN, UP TO 24 HOURS

## 2020-10-09 PROCEDURE — 99233 PR SUBSEQUENT HOSPITAL CARE,LEVL III: ICD-10-PCS | Mod: GC,,, | Performed by: INTERNAL MEDICINE

## 2020-10-09 PROCEDURE — 25000003 PHARM REV CODE 250: Performed by: INTERNAL MEDICINE

## 2020-10-09 PROCEDURE — 97535 SELF CARE MNGMENT TRAINING: CPT

## 2020-10-09 PROCEDURE — 94761 N-INVAS EAR/PLS OXIMETRY MLT: CPT

## 2020-10-09 PROCEDURE — 36415 COLL VENOUS BLD VENIPUNCTURE: CPT

## 2020-10-09 PROCEDURE — 99900035 HC TECH TIME PER 15 MIN (STAT)

## 2020-10-09 PROCEDURE — 25000003 PHARM REV CODE 250: Performed by: STUDENT IN AN ORGANIZED HEALTH CARE EDUCATION/TRAINING PROGRAM

## 2020-10-09 PROCEDURE — 83735 ASSAY OF MAGNESIUM: CPT

## 2020-10-09 PROCEDURE — 94664 DEMO&/EVAL PT USE INHALER: CPT

## 2020-10-09 PROCEDURE — 27000646 HC AEROBIKA DEVICE

## 2020-10-09 PROCEDURE — 99233 SBSQ HOSP IP/OBS HIGH 50: CPT | Mod: GC,,, | Performed by: INTERNAL MEDICINE

## 2020-10-09 PROCEDURE — 84100 ASSAY OF PHOSPHORUS: CPT

## 2020-10-09 PROCEDURE — 80053 COMPREHEN METABOLIC PANEL: CPT

## 2020-10-09 PROCEDURE — 63600175 PHARM REV CODE 636 W HCPCS: Performed by: INTERNAL MEDICINE

## 2020-10-09 PROCEDURE — 85027 COMPLETE CBC AUTOMATED: CPT

## 2020-10-09 PROCEDURE — 92526 ORAL FUNCTION THERAPY: CPT

## 2020-10-09 PROCEDURE — 20600001 HC STEP DOWN PRIVATE ROOM

## 2020-10-09 PROCEDURE — 63700000 PHARM REV CODE 250 ALT 637 W/O HCPCS: Performed by: STUDENT IN AN ORGANIZED HEALTH CARE EDUCATION/TRAINING PROGRAM

## 2020-10-09 PROCEDURE — 85007 BL SMEAR W/DIFF WBC COUNT: CPT

## 2020-10-09 PROCEDURE — 94640 AIRWAY INHALATION TREATMENT: CPT

## 2020-10-09 RX ORDER — HYDROCHLOROTHIAZIDE 25 MG/1
25 TABLET ORAL DAILY
Status: DISCONTINUED | OUTPATIENT
Start: 2020-10-09 | End: 2020-10-10

## 2020-10-09 RX ADMIN — PANTOPRAZOLE SODIUM 40 MG: 40 TABLET, DELAYED RELEASE ORAL at 08:10

## 2020-10-09 RX ADMIN — AZITHROMYCIN MONOHYDRATE 250 MG: 250 TABLET ORAL at 08:10

## 2020-10-09 RX ADMIN — ENOXAPARIN SODIUM 40 MG: 40 INJECTION SUBCUTANEOUS at 04:10

## 2020-10-09 RX ADMIN — DEXAMETHASONE 6 MG: 4 TABLET ORAL at 08:10

## 2020-10-09 RX ADMIN — IPRATROPIUM BROMIDE AND ALBUTEROL SULFATE 3 ML: .5; 2.5 SOLUTION RESPIRATORY (INHALATION) at 07:10

## 2020-10-09 RX ADMIN — Medication 10 MG: at 08:10

## 2020-10-09 RX ADMIN — CEFTRIAXONE SODIUM 1 G: 1 INJECTION, POWDER, FOR SOLUTION INTRAMUSCULAR; INTRAVENOUS at 08:10

## 2020-10-09 RX ADMIN — HYDROCHLOROTHIAZIDE 25 MG: 25 TABLET ORAL at 03:10

## 2020-10-09 RX ADMIN — REMDESIVIR 100 MG: 100 INJECTION, POWDER, LYOPHILIZED, FOR SOLUTION INTRAVENOUS at 03:10

## 2020-10-09 RX ADMIN — IPRATROPIUM BROMIDE AND ALBUTEROL SULFATE 3 ML: .5; 2.5 SOLUTION RESPIRATORY (INHALATION) at 12:10

## 2020-10-09 RX ADMIN — IPRATROPIUM BROMIDE AND ALBUTEROL SULFATE 3 ML: .5; 2.5 SOLUTION RESPIRATORY (INHALATION) at 01:10
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
## 2020-10-09 NOTE — PLAN OF CARE
Problem: Adult Inpatient Plan of Care  Goal: Plan of Care Review  Outcome: Ongoing, Progressing  Goal: Optimal Comfort and Wellbeing  Outcome: Ongoing, Progressing  Goal: Readiness for Transition of Care  Outcome: Ongoing, Progressing     Problem: Fall Injury Risk  Goal: Absence of Fall and Fall-Related Injury  Outcome: Ongoing, Progressing     Problem: Skin Injury Risk Increased  Goal: Skin Health and Integrity  Outcome: Ongoing, Progressing

## 2020-10-09 NOTE — NURSING
Pt resting quietly in bed with eyes closed, Resp unlabored. VSS. NAD noted. Easily arousable to verbal stimuli. Offers no complaints at present. Awaiting MD orders/ disposition. Bed rails up x 2 with bed locked in lowest position. Call light in reach. Will continue to monitor. ADMIT evaluation continues.

## 2020-10-09 NOTE — SUBJECTIVE & OBJECTIVE
Interval History: NAEON. Pt remains stable and sat'ing in the mid to high 90s on HF 10L. Pt is extremely hard of hearing and that makes communicating with him extremely difficult. Got in touch with granddaughter yesterday who is making medical decisions for the pt. She wants the pt full code and gave consent to remdesivir.     Review of Systems   Unable to perform ROS: Other   Pt is extremely hard of hearing       Objective:     Vital Signs (Most Recent):  Temp: 98.4 °F (36.9 °C) (10/09/20 0348)  Pulse: 71 (10/09/20 0704)  Resp: 15 (10/09/20 0704)  BP: (!) 140/76 (10/09/20 0348)  SpO2: 96 % (10/09/20 0704) Vital Signs (24h Range):  Temp:  [97.9 °F (36.6 °C)-98.9 °F (37.2 °C)] 98.4 °F (36.9 °C)  Pulse:  [62-78] 71  Resp:  [15-22] 15  SpO2:  [91 %-100 %] 96 %  BP: (129-162)/(66-93) 140/76     Weight: 101.8 kg (224 lb 6.9 oz)  Body mass index is 34.12 kg/m².    Intake/Output Summary (Last 24 hours) at 10/9/2020 0829  Last data filed at 10/9/2020 0500  Gross per 24 hour   Intake 320 ml   Output 1200 ml   Net -880 ml      Physical Exam  Vitals signs and nursing note reviewed.   Constitutional:       General: He is not in acute distress.     Appearance: Normal appearance. He is obese. He is not ill-appearing, toxic-appearing or diaphoretic.   HENT:      Head: Normocephalic and atraumatic.      Right Ear: External ear normal.      Left Ear: External ear normal.      Nose: Nose normal. No congestion or rhinorrhea.      Mouth/Throat:      Pharynx: Oropharynx is clear. No oropharyngeal exudate or posterior oropharyngeal erythema.   Eyes:      General: No scleral icterus.        Right eye: No discharge.         Left eye: No discharge.   Cardiovascular:      Rate and Rhythm: Normal rate and regular rhythm.      Pulses: Normal pulses.      Heart sounds: Murmur present. No friction rub. No gallop.    Pulmonary:      Effort: Pulmonary effort is normal. No respiratory distress.      Breath sounds: No stridor. Wheezing and rales  present. No rhonchi.   Abdominal:      General: Abdomen is flat. Bowel sounds are normal. There is distension.      Palpations: Abdomen is soft.      Tenderness: There is no abdominal tenderness. There is no guarding or rebound.   Musculoskeletal:      Right lower leg: No edema.      Left lower leg: No edema.   Skin:     General: Skin is warm and dry.      Coloration: Skin is not jaundiced or pale.      Findings: No bruising, erythema, lesion or rash.   Neurological:      Mental Status: He is alert.         Significant Labs:   CBC:   Recent Labs   Lab 10/08/20  0435   WBC 7.04   HGB 13.4*   HCT 43.9        CMP:   Recent Labs   Lab 10/08/20  0434      K 4.7      CO2 29   *   BUN 24*   CREATININE 1.0   CALCIUM 7.7*   PROT 6.6   ALBUMIN 2.8*   BILITOT 0.2   ALKPHOS 46*   AST 26   ALT 16   ANIONGAP 8   EGFRNONAA >60.0       Significant Imaging: None

## 2020-10-09 NOTE — NURSING
0710: report from nirmal durand at the bedside. Pt is [place]. No hearing aids available. telesitter present. Pt on 8L HFNC. Side rails up x3, bed in lowest position. Continue to monitor.   Pt up in chair. Received remdesivir dose.   1910: report given to oncoming nurse
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
## 2020-10-09 NOTE — PLAN OF CARE
Problem: SLP Goal  Goal: SLP Goal  Description: Speech Language Pathology Goals  Goals expected to be met by 10/15    1. Pt will tolerate diet of mechanical soft solids and thin liquids without overt clinical signs of aspiration     10/9/2020 1509 by GERRY Villagran, CCC-SLP  Outcome: Ongoing, Progressing  Pt appears to be tolerating mechanical soft diet and thin liquids.  Pt likely should remain on mechanical soft diet until dentures are present.  SLP to follow up x1 to ensure continued tolerance of diet.   GERRY Hubbard, CCC-SLP  Speech Language Pathologist  [phone]  10/9/2020

## 2020-10-09 NOTE — PT/OT/SLP PROGRESS
"Speech Language Pathology Treatment    Patient Name:  Femi Beck   MRN:  [id number]  Admitting Diagnosis: COVID-19 virus infection    Recommendations:                 General Recommendations:  check diet tolerance x1 to ensure continued tolerance  Diet recommendations:  Mechanical soft, Liquid Diet Level: Thin   Aspiration Precautions: 1 bite/sip at a time, Alternating bites/sips, Avoid talking while eating, Feed only when awake/alert, HOB to 90 degrees, Meds whole 1 at a time, Monitor for s/s of aspiration, Remain upright 30 minutes post meal, Small bites/sips and Strict aspiration precautions   General Precautions: Standard, aspiration, airborne, contact, droplet, fall, hearing impaired  Communication strategies:  go to room if call light pushed and pt is very [place]    Subjective     "Yeah, it's pretty good like that." pt stated regarding mechanical soft diet.     Pain/Comfort:  · Pain Rating 1: 0/10    Objective:     Has the patient been evaluated by SLP for swallowing?   Yes  Keep patient NPO? No   Current Respiratory Status: nasal cannula      Pt assisted with set up for lunch meal after HOB was elevated to achieve upright position.  Pt on 5L O2 and maintained SPO2 94-95% throughout session and PO intake.  Pt edentulous, but stated he has dentures at home which he wears when he is eating.  Pt observed self feeding baked fish, mashed potatoes, zucchini/squash, and Sprite via straw.  Pt indicated that he was content on current diet (mechanical soft) due to lack of dentures at this time.  Pt exhibited dry cough x 1 during management of food, but no other coughing was observed.  Pt also maintained SPO2>94% during intake.  Education provided to pt regarding role of SLP, recs to remain on current diet at this time, and aspiration precautions.  Pt demonstrated understanding of education provided, but will benefit from continued reinforcement.       Assessment:     Femi Beck is a 79 y.o. male with an SLP diagnosis " of Dysphagia.      Goals:   Multidisciplinary Problems     SLP Goals        Problem: SLP Goal    Goal Priority Disciplines Outcome   SLP Goal     SLP Ongoing, Progressing   Description: Speech Language Pathology Goals  Goals expected to be met by 10/15    1. Pt will tolerate diet of mechanical soft solids and thin liquids without overt clinical signs of aspiration                      Plan:     · Patient to be seen:  4 x/week   · Plan of Care expires:     · Plan of Care reviewed with:  patient   · SLP Follow-Up:  Yes       Discharge recommendations:  nursing facility, skilled     Time Tracking:     SLP Treatment Date:   10/09/20  Speech Start Time:  1125  Speech Stop Time:  1141     Speech Total Time (min):  16 min    Billable Minutes: Treatment Swallowing Dysfunction 8 and Seld Care/Home Management Training 8    GERRY Hubbard, CCC-SLP  10/09/2020     GERRY Hubbard, CCC-SLP  Speech Language Pathologist  [phone]  10/9/2020
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
## 2020-10-09 NOTE — NURSING
[facility] Per telesitter netta telesitter camera continues to go in and out. Repositioned camera towards door for better wifi reception. Per sitter camera remains with full view of patient.

## 2020-10-09 NOTE — NURSING
"Assumed care of pt bed 73. Pt with hearing deficit and no hearing aids available. Pt able to express needs / write without difficulty. Pt provided with dinner tray at this time. Per report patient was sleeping when trays were delivered. Pt required assistance opening trays but did not require assistance feeding self. Pt swallowing ordered diet without difficulty. Pt able to utilize urinal on own at this time and bed pan provided for bowel movements. Pt with generalized weakness. Pt desats while eating to 81%. Nasal cannula repositioned and sats increased to 91%. Pt states " I don't feel bad but I don't feel good. I guess I am so so ." Pt denies shortness of breath at rest. Pt became short of breath while repositioning in bed. Shortness of breath with exertion. Cough noted. No sputum production at this time. Pt denies fever/chills. Pt afebrile at this time. See flowsheets for temp. Pt with head of bed elevated for comfort. Pt denies pain with breathing. Edema noted to extremities. +1. Lung sounds diminished. Pt with unlabored work of breathing. No accessory muscle use and is able to speak in full and complete sentences. No skin discoloration noted. Assessment done per flowsheet. NAD noted. Awaiting MD orders/ disposition. Bed rails up x 2 with bed locked in lowest position. Call light in reach. Will continue to monitor. ADMIT evaluation continues. Pt verbalizes understanding of plan of care.    "

## 2020-10-09 NOTE — ASSESSMENT & PLAN NOTE
On HCTZ at home. Was held om admission in the setting of sepsis    Plan:  -Resume when clinically indicated

## 2020-10-09 NOTE — ASSESSMENT & PLAN NOTE
On metformin 1000mg bid at home.   Unclear if insulin naive or not?     Plan:  -On SSI and will adjust insulin based on SS requirements  -POCT bg tid wm and qHS

## 2020-10-10 LAB
ALBUMIN SERPL BCP-MCNC: 3.2 G/DL (ref 3.5–5.2)
ALP SERPL-CCNC: 52 U/L (ref 55–135)
ALT SERPL W/O P-5'-P-CCNC: 20 U/L (ref 10–44)
ANION GAP SERPL CALC-SCNC: 11 MMOL/L (ref 8–16)
ANISOCYTOSIS BLD QL SMEAR: SLIGHT
AST SERPL-CCNC: 29 U/L (ref 10–40)
BASOPHILS # BLD AUTO: ABNORMAL K/UL (ref 0–0.2)
BASOPHILS NFR BLD: 0 % (ref 0–1.9)
BILIRUB SERPL-MCNC: 0.4 MG/DL (ref 0.1–1)
BUN SERPL-MCNC: 19 MG/DL (ref 8–23)
CALCIUM SERPL-MCNC: 9 MG/DL (ref 8.7–10.5)
CHLORIDE SERPL-SCNC: 95 MMOL/L (ref 95–110)
CO2 SERPL-SCNC: 34 MMOL/L (ref 23–29)
CREAT SERPL-MCNC: 0.9 MG/DL (ref 0.5–1.4)
CRP SERPL-MCNC: 17.9 MG/L (ref 0–8.2)
D DIMER PPP IA.FEU-MCNC: 1.36 MG/L FEU
DIFFERENTIAL METHOD: ABNORMAL
EOSINOPHIL # BLD AUTO: ABNORMAL K/UL (ref 0–0.5)
EOSINOPHIL NFR BLD: 0 % (ref 0–8)
ERYTHROCYTE [DISTWIDTH] IN BLOOD BY AUTOMATED COUNT: 14.6 % (ref 11.5–14.5)
EST. GFR  (AFRICAN AMERICAN): >60 ML/MIN/1.73 M^2
EST. GFR  (NON AFRICAN AMERICAN): >60 ML/MIN/1.73 M^2
FERRITIN SERPL-MCNC: 236 NG/ML (ref 20–300)
GLUCOSE SERPL-MCNC: 157 MG/DL (ref 70–110)
HCT VFR BLD AUTO: 48.6 % (ref 40–54)
HGB BLD-MCNC: 15.5 G/DL (ref 14–18)
HYPOCHROMIA BLD QL SMEAR: ABNORMAL
IMM GRANULOCYTES # BLD AUTO: ABNORMAL K/UL (ref 0–0.04)
IMM GRANULOCYTES NFR BLD AUTO: ABNORMAL % (ref 0–0.5)
LDH SERPL L TO P-CCNC: 300 U/L (ref 110–260)
LYMPHOCYTES # BLD AUTO: ABNORMAL K/UL (ref 1–4.8)
LYMPHOCYTES NFR BLD: 18 % (ref 18–48)
MAGNESIUM SERPL-MCNC: 1.7 MG/DL (ref 1.6–2.6)
MCH RBC QN AUTO: 27.9 PG (ref 27–31)
MCHC RBC AUTO-ENTMCNC: 31.9 G/DL (ref 32–36)
MCV RBC AUTO: 88 FL (ref 82–98)
METAMYELOCYTES NFR BLD MANUAL: 1 %
MONOCYTES # BLD AUTO: ABNORMAL K/UL (ref 0.3–1)
MONOCYTES NFR BLD: 8 % (ref 4–15)
MYELOCYTES NFR BLD MANUAL: 7 %
NEUTROPHILS NFR BLD: 62 % (ref 38–73)
NEUTS BAND NFR BLD MANUAL: 2 %
NRBC BLD-RTO: 0 /100 WBC
PHOSPHATE SERPL-MCNC: 1.4 MG/DL (ref 2.7–4.5)
PLATELET # BLD AUTO: 206 K/UL (ref 150–350)
PLATELET BLD QL SMEAR: ABNORMAL
PMV BLD AUTO: 9.2 FL (ref 9.2–12.9)
POCT GLUCOSE: 149 MG/DL (ref 70–110)
POCT GLUCOSE: 189 MG/DL (ref 70–110)
POCT GLUCOSE: 254 MG/DL (ref 70–110)
POTASSIUM SERPL-SCNC: 4 MMOL/L (ref 3.5–5.1)
PROMYELOCYTES NFR BLD MANUAL: 2 %
PROT SERPL-MCNC: 7.3 G/DL (ref 6–8.4)
RBC # BLD AUTO: 5.55 M/UL (ref 4.6–6.2)
SODIUM SERPL-SCNC: 140 MMOL/L (ref 136–145)
WBC # BLD AUTO: 6.34 K/UL (ref 3.9–12.7)

## 2020-10-10 PROCEDURE — 63600175 PHARM REV CODE 636 W HCPCS: Performed by: STUDENT IN AN ORGANIZED HEALTH CARE EDUCATION/TRAINING PROGRAM

## 2020-10-10 PROCEDURE — 85379 FIBRIN DEGRADATION QUANT: CPT

## 2020-10-10 PROCEDURE — 63600175 PHARM REV CODE 636 W HCPCS: Performed by: INTERNAL MEDICINE

## 2020-10-10 PROCEDURE — 94640 AIRWAY INHALATION TREATMENT: CPT

## 2020-10-10 PROCEDURE — 27000646 HC AEROBIKA DEVICE

## 2020-10-10 PROCEDURE — 80053 COMPREHEN METABOLIC PANEL: CPT

## 2020-10-10 PROCEDURE — 36415 COLL VENOUS BLD VENIPUNCTURE: CPT

## 2020-10-10 PROCEDURE — 25000242 PHARM REV CODE 250 ALT 637 W/ HCPCS: Performed by: HOSPITALIST

## 2020-10-10 PROCEDURE — 99233 SBSQ HOSP IP/OBS HIGH 50: CPT | Mod: GC,,, | Performed by: INTERNAL MEDICINE

## 2020-10-10 PROCEDURE — 83615 LACTATE (LD) (LDH) ENZYME: CPT

## 2020-10-10 PROCEDURE — 94761 N-INVAS EAR/PLS OXIMETRY MLT: CPT

## 2020-10-10 PROCEDURE — 99900035 HC TECH TIME PER 15 MIN (STAT)

## 2020-10-10 PROCEDURE — 84100 ASSAY OF PHOSPHORUS: CPT

## 2020-10-10 PROCEDURE — 63700000 PHARM REV CODE 250 ALT 637 W/O HCPCS: Performed by: INTERNAL MEDICINE

## 2020-10-10 PROCEDURE — 25000003 PHARM REV CODE 250: Performed by: INTERNAL MEDICINE

## 2020-10-10 PROCEDURE — 25000003 PHARM REV CODE 250: Performed by: STUDENT IN AN ORGANIZED HEALTH CARE EDUCATION/TRAINING PROGRAM

## 2020-10-10 PROCEDURE — 85027 COMPLETE CBC AUTOMATED: CPT

## 2020-10-10 PROCEDURE — 86140 C-REACTIVE PROTEIN: CPT

## 2020-10-10 PROCEDURE — 85007 BL SMEAR W/DIFF WBC COUNT: CPT

## 2020-10-10 PROCEDURE — 94664 DEMO&/EVAL PT USE INHALER: CPT

## 2020-10-10 PROCEDURE — 82728 ASSAY OF FERRITIN: CPT

## 2020-10-10 PROCEDURE — 20600001 HC STEP DOWN PRIVATE ROOM

## 2020-10-10 PROCEDURE — 99233 PR SUBSEQUENT HOSPITAL CARE,LEVL III: ICD-10-PCS | Mod: GC,,, | Performed by: INTERNAL MEDICINE

## 2020-10-10 PROCEDURE — 27000221 HC OXYGEN, UP TO 24 HOURS

## 2020-10-10 PROCEDURE — 94799 UNLISTED PULMONARY SVC/PX: CPT

## 2020-10-10 PROCEDURE — 83735 ASSAY OF MAGNESIUM: CPT

## 2020-10-10 RX ORDER — HYDROCHLOROTHIAZIDE 25 MG/1
25 TABLET ORAL ONCE
Status: DISCONTINUED | OUTPATIENT
Start: 2020-10-10 | End: 2020-10-15 | Stop reason: HOSPADM

## 2020-10-10 RX ORDER — HYDROCHLOROTHIAZIDE 25 MG/1
50 TABLET ORAL DAILY
Status: DISCONTINUED | OUTPATIENT
Start: 2020-10-11 | End: 2020-10-15 | Stop reason: HOSPADM

## 2020-10-10 RX ORDER — HYDRALAZINE HYDROCHLORIDE 20 MG/ML
10 INJECTION INTRAMUSCULAR; INTRAVENOUS ONCE
Status: COMPLETED | OUTPATIENT
Start: 2020-10-10 | End: 2020-10-10

## 2020-10-10 RX ORDER — ONDANSETRON 2 MG/ML
4 INJECTION INTRAMUSCULAR; INTRAVENOUS ONCE
Status: COMPLETED | OUTPATIENT
Start: 2020-10-10 | End: 2020-10-10

## 2020-10-10 RX ORDER — HYDRALAZINE HYDROCHLORIDE 20 MG/ML
20 INJECTION INTRAMUSCULAR; INTRAVENOUS ONCE
Status: DISCONTINUED | OUTPATIENT
Start: 2020-10-10 | End: 2020-10-10

## 2020-10-10 RX ORDER — MAGNESIUM SULFATE HEPTAHYDRATE 40 MG/ML
2 INJECTION, SOLUTION INTRAVENOUS ONCE
Status: COMPLETED | OUTPATIENT
Start: 2020-10-10 | End: 2020-10-10

## 2020-10-10 RX ADMIN — PANTOPRAZOLE SODIUM 40 MG: 40 TABLET, DELAYED RELEASE ORAL at 08:10

## 2020-10-10 RX ADMIN — HYDROCHLOROTHIAZIDE 25 MG: 25 TABLET ORAL at 08:10

## 2020-10-10 RX ADMIN — INSULIN ASPART 3 UNITS: 100 INJECTION, SOLUTION INTRAVENOUS; SUBCUTANEOUS at 11:10

## 2020-10-10 RX ADMIN — IPRATROPIUM BROMIDE AND ALBUTEROL SULFATE 3 ML: .5; 2.5 SOLUTION RESPIRATORY (INHALATION) at 07:10

## 2020-10-10 RX ADMIN — IPRATROPIUM BROMIDE AND ALBUTEROL SULFATE 3 ML: .5; 2.5 SOLUTION RESPIRATORY (INHALATION) at 01:10

## 2020-10-10 RX ADMIN — HYDRALAZINE HYDROCHLORIDE 10 MG: 20 INJECTION INTRAMUSCULAR; INTRAVENOUS at 01:10

## 2020-10-10 RX ADMIN — Medication 10 MG: at 12:10

## 2020-10-10 RX ADMIN — DEXAMETHASONE 6 MG: 4 TABLET ORAL at 08:10

## 2020-10-10 RX ADMIN — ENOXAPARIN SODIUM 40 MG: 40 INJECTION SUBCUTANEOUS at 04:10

## 2020-10-10 RX ADMIN — IPRATROPIUM BROMIDE AND ALBUTEROL SULFATE 3 ML: .5; 2.5 SOLUTION RESPIRATORY (INHALATION) at 12:10

## 2020-10-10 RX ADMIN — MAGNESIUM SULFATE 2 G: 2 INJECTION INTRAVENOUS at 08:10

## 2020-10-10 RX ADMIN — AZITHROMYCIN MONOHYDRATE 250 MG: 250 TABLET ORAL at 08:10

## 2020-10-10 RX ADMIN — SODIUM PHOSPHATE, MONOBASIC, MONOHYDRATE AND SODIUM PHOSPHATE, DIBASIC, ANHYDROUS 30 MMOL: 276; 142 INJECTION, SOLUTION INTRAVENOUS at 09:10

## 2020-10-10 RX ADMIN — Medication 10 MG: at 05:10

## 2020-10-10 RX ADMIN — ONDANSETRON 4 MG: 2 INJECTION INTRAMUSCULAR; INTRAVENOUS at 01:10

## 2020-10-10 RX ADMIN — REMDESIVIR 100 MG: 100 INJECTION, POWDER, LYOPHILIZED, FOR SOLUTION INTRAVENOUS at 03:10

## 2020-10-10 NOTE — PLAN OF CARE
Problem: Adult Inpatient Plan of Care  Goal: Plan of Care Review  Outcome: Ongoing, Progressing  Goal: Patient-Specific Goal (Individualization)  Outcome: Ongoing, Progressing  Goal: Absence of Hospital-Acquired Illness or Injury  Outcome: Ongoing, Progressing  Goal: Optimal Comfort and Wellbeing  Outcome: Ongoing, Progressing  Goal: Readiness for Transition of Care  Outcome: Ongoing, Progressing  Goal: Rounds/Family Conference  Outcome: Ongoing, Progressing     Problem: Fall Injury Risk  Goal: Absence of Fall and Fall-Related Injury  Outcome: Ongoing, Progressing     Problem: Skin Injury Risk Increased  Goal: Skin Health and Integrity  Outcome: Ongoing, Progressing     Problem: Diabetes Comorbidity  Goal: Blood Glucose Level Within Desired Range  Outcome: Ongoing, Progressing     2021 ASSUMED CARE OF PT. VSS EXCEPT BP ELEVATED MEDICATED WITH LABETALOL PRN AS PER ORDER. FSG DONE. NO COVERAGE. PT ON 4L INCREASED TO 5LNC NOTIFIED RT. PT VOIDS CONSTANTLY. TELESITTER IN ROOM.  0000 PT COMPLAINS OF SOMETHING BEING ON HIS STOMACH. BP REMAINS ELEVATED. LABETALOL PRN AS ORDERED. 0030 PT BEGINS COUGHING AND WHILE C/O OF SOMETHING ON HIS STOMACH, HE VOMITS  ABOUT 50ML IN A CUP. NOTIFYING MD. PT VOMITTED AGAIN. HYDRALAZINE AND ZOFRAN AS ORDERED. 0400 PT BP ELEVATED. LABETALOL AS ORDERED.  BM X1.  REPORT GIVEN TO ONCOMING RN
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
## 2020-10-10 NOTE — SUBJECTIVE & OBJECTIVE
Interval History: Pt had a hypertensive episode over night which was treated overnight w/ IV labetalol and hydralazine. Pt's SBP back in the 120s to 140s this morning. Otherwise pt is doing well this morning. Was up in the chair this morning and appears comfortable and he was asking for coffee.   Interval History: NAEON. Pt remains stable and sat'ing in the mid to high 90s on HF 10L. Pt is extremely hard of hearing and that makes communicating with him extremely difficult. Got in touch with granddaughter yesterday who is making medical decisions for the pt. She wants the pt full code and gave consent to remdesivir.     Review of Systems   Unable to perform ROS: Other   Pt is extremely hard of hearing       Objective:     Vital Signs (Most Recent):  Temp: 97.7 °F (36.5 °C) (10/10/20 0814)  Pulse: 69 (10/10/20 0814)  Resp: 16 (10/10/20 0814)  BP: (!) 142/74 (10/10/20 0814)  SpO2: (!) 91 % (10/10/20 0814) Vital Signs (24h Range):  Temp:  [97.7 °F (36.5 °C)-98.3 °F (36.8 °C)] 97.7 °F (36.5 °C)  Pulse:  [61-75] 69  Resp:  [16-22] 16  SpO2:  [88 %-96 %] 91 %  BP: (129-214)/() 142/74     Weight: 101.8 kg (224 lb 6.9 oz)  Body mass index is 34.12 kg/m².    Intake/Output Summary (Last 24 hours) at 10/10/2020 1121  Last data filed at 10/10/2020 1000  Gross per 24 hour   Intake 1110 ml   Output 2000 ml   Net -890 ml      Physical Exam  Vitals signs and nursing note reviewed.   Constitutional:       General: He is not in acute distress.     Appearance: Normal appearance. He is obese. He is not ill-appearing, toxic-appearing or diaphoretic.   HENT:      Head: Normocephalic and atraumatic.      Right Ear: External ear normal.      Left Ear: External ear normal.      Nose: Nose normal. No congestion or rhinorrhea.      Mouth/Throat:      Pharynx: Oropharynx is clear. No oropharyngeal exudate or posterior oropharyngeal erythema.   Eyes:      General: No scleral icterus.        Right eye: No discharge.         Left eye: No  discharge.   Cardiovascular:      Rate and Rhythm: Normal rate and regular rhythm.      Pulses: Normal pulses.      Heart sounds: Murmur present. No friction rub. No gallop.    Pulmonary:      Effort: Pulmonary effort is normal. No respiratory distress.      Breath sounds: No stridor. Wheezing and rales present. No rhonchi.   Abdominal:      General: Abdomen is flat. Bowel sounds are normal. There is distension.      Palpations: Abdomen is soft.      Tenderness: There is no abdominal tenderness. There is no guarding or rebound.   Musculoskeletal:      Right lower leg: No edema.      Left lower leg: No edema.   Skin:     General: Skin is warm and dry.      Coloration: Skin is not jaundiced or pale.      Findings: No bruising, erythema, lesion or rash.   Neurological:      Mental Status: He is alert.         Significant Labs:   CBC:   Recent Labs   Lab 10/09/20  0644 10/10/20  0405   WBC 4.72 6.34   HGB 13.7* 15.5   HCT 45.7 48.6    206     CMP:   Recent Labs   Lab 10/09/20  0644 10/10/20  0405    140   K 4.6 4.0    95   CO2 31* 34*   * 157*   BUN 23 19   CREATININE 1.0 0.9   CALCIUM 7.8* 9.0   PROT 6.5 7.3   ALBUMIN 2.8* 3.2*   BILITOT 0.2 0.4   ALKPHOS 43* 52*   AST 25 29   ALT 18 20   ANIONGAP 11 11   EGFRNONAA >60.0 >60.0       Significant Imaging: None     Review of Systems   Unable to perform ROS: Other   Pt is extremely hard of hearing       Objective:     Vital Signs (Most Recent):  Temp: 97.7 °F (36.5 °C) (10/10/20 0814)  Pulse: 69 (10/10/20 0814)  Resp: 16 (10/10/20 0814)  BP: (!) 142/74 (10/10/20 0814)  SpO2: (!) 91 % (10/10/20 0814) Vital Signs (24h Range):  Temp:  [97.7 °F (36.5 °C)-98.3 °F (36.8 °C)] 97.7 °F (36.5 °C)  Pulse:  [61-75] 69  Resp:  [16-22] 16  SpO2:  [88 %-96 %] 91 %  BP: (129-214)/() 142/74     Weight: 101.8 kg (224 lb 6.9 oz)  Body mass index is 34.12 kg/m².    Intake/Output Summary (Last 24 hours) at 10/10/2020 1118  Last data filed at 10/10/2020  1000  Gross per 24 hour   Intake 1110 ml   Output 2000 ml   Net -890 ml      Physical Exam  Vitals signs and nursing note reviewed.   Constitutional:       General: He is not in acute distress.     Appearance: Normal appearance. He is obese. He is not ill-appearing, toxic-appearing or diaphoretic.   HENT:      Head: Normocephalic and atraumatic.      Right Ear: External ear normal.      Left Ear: External ear normal.      Nose: Nose normal. No congestion or rhinorrhea.      Mouth/Throat:      Pharynx: Oropharynx is clear. No oropharyngeal exudate or posterior oropharyngeal erythema.   Eyes:      General: No scleral icterus.        Right eye: No discharge.         Left eye: No discharge.   Cardiovascular:      Rate and Rhythm: Normal rate and regular rhythm.      Pulses: Normal pulses.      Heart sounds: Murmur present. No friction rub. No gallop.    Pulmonary:      Effort: Pulmonary effort is normal. No respiratory distress.      Breath sounds: No stridor. No rhonchi.   Abdominal:      General: Abdomen is flat. Bowel sounds are normal. There is distension.      Palpations: Abdomen is soft.      Tenderness: There is no abdominal tenderness. There is no guarding or rebound.   Musculoskeletal:      Right lower leg: No edema.      Left lower leg: No edema.   Skin:     General: Skin is warm and dry.      Coloration: Skin is not jaundiced or pale.      Findings: No bruising, erythema, lesion or rash.   Neurological:      Mental Status: He is alert.         Significant Labs:   CBC:   Recent Labs   Lab 10/09/20  0644 10/10/20  0405   WBC 4.72 6.34   HGB 13.7* 15.5   HCT 45.7 48.6    206     CMP:   Recent Labs   Lab 10/09/20  0644 10/10/20  0405    140   K 4.6 4.0    95   CO2 31* 34*   * 157*   BUN 23 19   CREATININE 1.0 0.9   CALCIUM 7.8* 9.0   PROT 6.5 7.3   ALBUMIN 2.8* 3.2*   BILITOT 0.2 0.4   ALKPHOS 43* 52*   AST 25 29   ALT 18 20   ANIONGAP 11 11   EGFRNONAA >60.0 >60.0       Significant  Imaging: None
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
## 2020-10-10 NOTE — CARE UPDATE
Rapid Response Nurse Chart Check     Chart check completed, abnormal VS noted. Please call [phone] for further concerns or assistance.

## 2020-10-11 LAB
ALBUMIN SERPL BCP-MCNC: 3.1 G/DL (ref 3.5–5.2)
ALP SERPL-CCNC: 50 U/L (ref 55–135)
ALT SERPL W/O P-5'-P-CCNC: 26 U/L (ref 10–44)
ANION GAP SERPL CALC-SCNC: 8 MMOL/L (ref 8–16)
ANISOCYTOSIS BLD QL SMEAR: SLIGHT
AST SERPL-CCNC: 28 U/L (ref 10–40)
BASOPHILS NFR BLD: 0 % (ref 0–1.9)
BILIRUB SERPL-MCNC: 0.3 MG/DL (ref 0.1–1)
BUN SERPL-MCNC: 22 MG/DL (ref 8–23)
CALCIUM SERPL-MCNC: 9.1 MG/DL (ref 8.7–10.5)
CHLORIDE SERPL-SCNC: 94 MMOL/L (ref 95–110)
CO2 SERPL-SCNC: 35 MMOL/L (ref 23–29)
CREAT SERPL-MCNC: 0.9 MG/DL (ref 0.5–1.4)
DIFFERENTIAL METHOD: ABNORMAL
EOSINOPHIL NFR BLD: 0 % (ref 0–8)
ERYTHROCYTE [DISTWIDTH] IN BLOOD BY AUTOMATED COUNT: 14.7 % (ref 11.5–14.5)
EST. GFR  (AFRICAN AMERICAN): >60 ML/MIN/1.73 M^2
EST. GFR  (NON AFRICAN AMERICAN): >60 ML/MIN/1.73 M^2
GLUCOSE SERPL-MCNC: 132 MG/DL (ref 70–110)
HCT VFR BLD AUTO: 46.4 % (ref 40–54)
HGB BLD-MCNC: 14.8 G/DL (ref 14–18)
IMM GRANULOCYTES # BLD AUTO: ABNORMAL K/UL (ref 0–0.04)
IMM GRANULOCYTES NFR BLD AUTO: ABNORMAL % (ref 0–0.5)
LYMPHOCYTES NFR BLD: 14 % (ref 18–48)
MAGNESIUM SERPL-MCNC: 1.9 MG/DL (ref 1.6–2.6)
MCH RBC QN AUTO: 27.6 PG (ref 27–31)
MCHC RBC AUTO-ENTMCNC: 31.9 G/DL (ref 32–36)
MCV RBC AUTO: 87 FL (ref 82–98)
METAMYELOCYTES NFR BLD MANUAL: 3 %
MONOCYTES NFR BLD: 10 % (ref 4–15)
NEUTROPHILS NFR BLD: 69 % (ref 38–73)
NEUTS BAND NFR BLD MANUAL: 4 %
NRBC BLD-RTO: 0 /100 WBC
PHOSPHATE SERPL-MCNC: 2.6 MG/DL (ref 2.7–4.5)
PLATELET # BLD AUTO: 215 K/UL (ref 150–350)
PMV BLD AUTO: 9 FL (ref 9.2–12.9)
POCT GLUCOSE: 124 MG/DL (ref 70–110)
POCT GLUCOSE: 159 MG/DL (ref 70–110)
POCT GLUCOSE: 170 MG/DL (ref 70–110)
POCT GLUCOSE: 178 MG/DL (ref 70–110)
POCT GLUCOSE: 179 MG/DL (ref 70–110)
POLYCHROMASIA BLD QL SMEAR: ABNORMAL
POTASSIUM SERPL-SCNC: 3.9 MMOL/L (ref 3.5–5.1)
PROT SERPL-MCNC: 6.8 G/DL (ref 6–8.4)
RBC # BLD AUTO: 5.36 M/UL (ref 4.6–6.2)
SODIUM SERPL-SCNC: 137 MMOL/L (ref 136–145)
WBC # BLD AUTO: 7.57 K/UL (ref 3.9–12.7)

## 2020-10-11 PROCEDURE — 94761 N-INVAS EAR/PLS OXIMETRY MLT: CPT

## 2020-10-11 PROCEDURE — 85027 COMPLETE CBC AUTOMATED: CPT

## 2020-10-11 PROCEDURE — 27000221 HC OXYGEN, UP TO 24 HOURS

## 2020-10-11 PROCEDURE — 94640 AIRWAY INHALATION TREATMENT: CPT

## 2020-10-11 PROCEDURE — 99900035 HC TECH TIME PER 15 MIN (STAT)

## 2020-10-11 PROCEDURE — 63700000 PHARM REV CODE 250 ALT 637 W/O HCPCS: Performed by: INTERNAL MEDICINE

## 2020-10-11 PROCEDURE — 83735 ASSAY OF MAGNESIUM: CPT

## 2020-10-11 PROCEDURE — 85007 BL SMEAR W/DIFF WBC COUNT: CPT

## 2020-10-11 PROCEDURE — 25000242 PHARM REV CODE 250 ALT 637 W/ HCPCS: Performed by: HOSPITALIST

## 2020-10-11 PROCEDURE — 36415 COLL VENOUS BLD VENIPUNCTURE: CPT

## 2020-10-11 PROCEDURE — 99232 PR SUBSEQUENT HOSPITAL CARE,LEVL II: ICD-10-PCS | Mod: GC,,, | Performed by: INTERNAL MEDICINE

## 2020-10-11 PROCEDURE — 99232 SBSQ HOSP IP/OBS MODERATE 35: CPT | Mod: GC,,, | Performed by: INTERNAL MEDICINE

## 2020-10-11 PROCEDURE — 94799 UNLISTED PULMONARY SVC/PX: CPT

## 2020-10-11 PROCEDURE — 63600175 PHARM REV CODE 636 W HCPCS: Performed by: INTERNAL MEDICINE

## 2020-10-11 PROCEDURE — 20600001 HC STEP DOWN PRIVATE ROOM

## 2020-10-11 PROCEDURE — 25000003 PHARM REV CODE 250: Performed by: STUDENT IN AN ORGANIZED HEALTH CARE EDUCATION/TRAINING PROGRAM

## 2020-10-11 PROCEDURE — 27000646 HC AEROBIKA DEVICE

## 2020-10-11 PROCEDURE — 63600175 PHARM REV CODE 636 W HCPCS: Performed by: STUDENT IN AN ORGANIZED HEALTH CARE EDUCATION/TRAINING PROGRAM

## 2020-10-11 PROCEDURE — 84100 ASSAY OF PHOSPHORUS: CPT

## 2020-10-11 PROCEDURE — 94664 DEMO&/EVAL PT USE INHALER: CPT

## 2020-10-11 PROCEDURE — 80053 COMPREHEN METABOLIC PANEL: CPT

## 2020-10-11 RX ORDER — DEXAMETHASONE 6 MG/1
6 TABLET ORAL DAILY
Qty: 3 TABLET | Refills: 0 | Status: SHIPPED | OUTPATIENT
Start: 2020-10-12 | End: 2020-10-15 | Stop reason: HOSPADM

## 2020-10-11 RX ADMIN — ENOXAPARIN SODIUM 40 MG: 40 INJECTION SUBCUTANEOUS at 04:10

## 2020-10-11 RX ADMIN — IPRATROPIUM BROMIDE AND ALBUTEROL SULFATE 3 ML: .5; 2.5 SOLUTION RESPIRATORY (INHALATION) at 01:10

## 2020-10-11 RX ADMIN — IPRATROPIUM BROMIDE AND ALBUTEROL SULFATE 3 ML: .5; 2.5 SOLUTION RESPIRATORY (INHALATION) at 07:10

## 2020-10-11 RX ADMIN — AZITHROMYCIN MONOHYDRATE 250 MG: 250 TABLET ORAL at 08:10

## 2020-10-11 RX ADMIN — DEXAMETHASONE 6 MG: 4 TABLET ORAL at 08:10

## 2020-10-11 RX ADMIN — PANTOPRAZOLE SODIUM 40 MG: 40 TABLET, DELAYED RELEASE ORAL at 08:10

## 2020-10-11 RX ADMIN — HYDROCHLOROTHIAZIDE 50 MG: 25 TABLET ORAL at 08:10

## 2020-10-11 RX ADMIN — CEFTRIAXONE SODIUM 1 G: 1 INJECTION, POWDER, FOR SOLUTION INTRAMUSCULAR; INTRAVENOUS at 01:10

## 2020-10-11 RX ADMIN — IPRATROPIUM BROMIDE AND ALBUTEROL SULFATE 3 ML: .5; 2.5 SOLUTION RESPIRATORY (INHALATION) at 12:10

## 2020-10-11 NOTE — PLAN OF CARE
Problem: Adult Inpatient Plan of Care  Goal: Plan of Care Review  Outcome: Ongoing, Progressing  Goal: Patient-Specific Goal (Individualization)  Outcome: Ongoing, Progressing  Goal: Absence of Hospital-Acquired Illness or Injury  Outcome: Ongoing, Progressing  Goal: Optimal Comfort and Wellbeing  Outcome: Ongoing, Progressing  Goal: Readiness for Transition of Care  Outcome: Ongoing, Progressing     Patient alert and oriented to self, place and situation but disoriented to time. Extremely hard of hearing. No hearing aids at bedside. Denies chest pain, shortness of breath or dizziness. Preferred to sleep in the recliner overnight for sleep. Monitor shows patient in sinus rhythm. Vital signs stable on 7L HFNC. Free from falls. Call bell within reach. Rounding in place for comfort and safety. Will continue to monitor.
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
## 2020-10-11 NOTE — CARE UPDATE
Rapid Response Nurse Chart Check     Chart check completed, abnormal VS noted. Please call [phone] for further concerns or assistance.

## 2020-10-11 NOTE — PROGRESS NOTES
"[facility] - ICU 15 [facility] Medicine  Progress Note    Patient Name: Femi Beck  MRN: [id number]  Patient Class: IP- Inpatient   Admission Date: 10/8/2020  Length of Stay: 3 days  Attending Physician: Andie Hernandez MD  Primary Care Provider: Primary Doctor No        Subjective:     Principal Problem:COVID-19 virus infection        HPI:  Mr. Beck is an 79 year-old male with COPD, HTN, DM2, and extremely hard of hearing who is [place] [facility] [place]. Of note, the entire history was obtained from the patient's physical paper chart, as the patient is extremely hard of hearing. He presented to [facility] on 10/05 with encephalopathy and cough. On arrival there he was normotensive, tachycardic, mildly febrile (100.6), nontachypneic, and with O2 saturations at 88% on room air. He was placed on supplemental O2 for improvement. Labs were significant for a leukocytosis 14.8, an TASHA (creat 1.4, unclear baseline), hyponatremia 128, , lactate 2.0 which trended down to 1.5, trop 0.02, and elevated inflammatory markers (CRP 33, ferritin 129, procal 5.1, ). He tested negative for Influenza A+B. A CXR revealed basal interstitial changes and no evidence of consolidation. He was admitted to the ICU and started on cefepime and azithromycin for CAP, dexamethasone, remdesivir, and was additionally given convalescent plasma. Per records the AMS, leukocytosis, TASHA, and hyponatremia improved throughout his stay.     Overview/Hospital Course:  Pt [place] [facility] [place] anticipation of [place] the [facility]. Pt was stable on presentation on HF 10L. He was started on cefepime, azithro, dexamethasone, and remdesivir at OSH all of which are being continued during his current admission at [facility]. Pt remains HD and on HF 10L. Pt is extremely hard of hearing and communicating with him is very difficult, but reached out to the pt's grand " daughter Lucille who is making medical decisions for him. Pt continues to be stable but requiring oxygen supplementation. PT/OT recommended SNF upon discharge.     Interval History: NAEON. Pt was comfortable in his chair on assessment today.     Review of Systems   Unable to perform ROS: Other   Pt is extremely hard of hearing       Objective:     Vital Signs (Most Recent):  Temp: 98.1 °F (36.7 °C) (10/11/20 1223)  Pulse: 71 (10/11/20 1333)  Resp: 18 (10/11/20 1333)  BP: (!) 158/79 (10/11/20 1223)  SpO2: (!) 92 % (10/11/20 1333) Vital Signs (24h Range):  Temp:  [97.8 °F (36.6 °C)-98.2 °F (36.8 °C)] 98.1 °F (36.7 °C)  Pulse:  [57-81] 71  Resp:  [18-20] 18  SpO2:  [87 %-97 %] 92 %  BP: (142-162)/(64-83) 158/79     Weight: 101.8 kg (224 lb 6.9 oz)  Body mass index is 34.12 kg/m².    Intake/Output Summary (Last 24 hours) at 10/11/2020 1431  Last data filed at 10/11/2020 1300  Gross per 24 hour   Intake 1290 ml   Output 2035 ml   Net -745 ml      Physical Exam  Vitals signs and nursing note reviewed.   Constitutional:       General: He is not in acute distress.     Appearance: Normal appearance. He is obese. He is not ill-appearing, toxic-appearing or diaphoretic.   HENT:      Head: Normocephalic and atraumatic.      Right Ear: External ear normal.      Left Ear: External ear normal.      Nose: Nose normal. No congestion or rhinorrhea.      Mouth/Throat:      Pharynx: Oropharynx is clear. No oropharyngeal exudate or posterior oropharyngeal erythema.   Eyes:      General: No scleral icterus.        Right eye: No discharge.         Left eye: No discharge.   Cardiovascular:      Rate and Rhythm: Normal rate and regular rhythm.      Pulses: Normal pulses.      Heart sounds: Murmur present. No friction rub. No gallop.    Pulmonary:      Effort: Pulmonary effort is normal. No respiratory distress.      Breath sounds: No stridor. Wheezing and rales present. No rhonchi.   Abdominal:      General: Abdomen is flat. Bowel sounds are  normal. There is distension.      Palpations: Abdomen is soft.      Tenderness: There is no abdominal tenderness. There is no guarding or rebound.   Musculoskeletal:      Right lower leg: No edema.      Left lower leg: No edema.   Skin:     General: Skin is warm and dry.      Coloration: Skin is not jaundiced or pale.      Findings: No bruising, erythema, lesion or rash.   Neurological:      Mental Status: He is alert.         Significant Labs:   CBC:   Recent Labs   Lab 10/10/20  0405 10/11/20  0357   WBC 6.34 7.57   HGB 15.5 14.8   HCT 48.6 46.4    215     CMP:   Recent Labs   Lab 10/10/20  0405 10/11/20  0357    137   K 4.0 3.9   CL 95 94*   CO2 34* 35*   * 132*   BUN 19 22   CREATININE 0.9 0.9   CALCIUM 9.0 9.1   PROT 7.3 6.8   ALBUMIN 3.2* 3.1*   BILITOT 0.4 0.3   ALKPHOS 52* 50*   AST 29 28   ALT 20 26   ANIONGAP 11 8   EGFRNONAA >60.0 >60.0       Significant Imaging: None     Review of Systems   Unable to perform ROS: Other   Pt is extremely hard of hearing       Objective:     Vital Signs (Most Recent):  Temp: 98.1 °F (36.7 °C) (10/11/20 1223)  Pulse: 71 (10/11/20 1333)  Resp: 18 (10/11/20 1333)  BP: (!) 158/79 (10/11/20 1223)  SpO2: (!) 92 % (10/11/20 1333) Vital Signs (24h Range):  Temp:  [97.8 °F (36.6 °C)-98.2 °F (36.8 °C)] 98.1 °F (36.7 °C)  Pulse:  [57-81] 71  Resp:  [18-20] 18  SpO2:  [87 %-97 %] 92 %  BP: (142-162)/(64-83) 158/79     Weight: 101.8 kg (224 lb 6.9 oz)  Body mass index is 34.12 kg/m².    Intake/Output Summary (Last 24 hours) at 10/11/2020 1431  Last data filed at 10/11/2020 1300  Gross per 24 hour   Intake 1290 ml   Output 2035 ml   Net -745 ml      Physical Exam  Vitals signs and nursing note reviewed.   Constitutional:       General: He is not in acute distress.     Appearance: Normal appearance. He is obese. He is not ill-appearing, toxic-appearing or diaphoretic.   HENT:      Head: Normocephalic and atraumatic.      Right Ear: External ear normal.      Left Ear:  External ear normal.      Nose: Nose normal. No congestion or rhinorrhea.      Mouth/Throat:      Pharynx: Oropharynx is clear. No oropharyngeal exudate or posterior oropharyngeal erythema.   Eyes:      General: No scleral icterus.        Right eye: No discharge.         Left eye: No discharge.   Cardiovascular:      Rate and Rhythm: Normal rate and regular rhythm.      Pulses: Normal pulses.      Heart sounds: Murmur present. No friction rub. No gallop.    Pulmonary:      Effort: Pulmonary effort is normal. No respiratory distress.      Breath sounds: No stridor. No rhonchi.   Abdominal:      General: Abdomen is flat. Bowel sounds are normal. There is distension.      Palpations: Abdomen is soft.      Tenderness: There is no abdominal tenderness. There is no guarding or rebound.   Musculoskeletal:      Right lower leg: No edema.      Left lower leg: No edema.   Skin:     General: Skin is warm and dry.      Coloration: Skin is not jaundiced or pale.      Findings: No bruising, erythema, lesion or rash.   Neurological:      Mental Status: He is alert.         Significant Labs:   CBC:   Recent Labs   Lab 10/10/20  0405 10/11/20  0357   WBC 6.34 7.57   HGB 15.5 14.8   HCT 48.6 46.4    215     CMP:   Recent Labs   Lab 10/10/20  0405 10/11/20  0357    137   K 4.0 3.9   CL 95 94*   CO2 34* 35*   * 132*   BUN 19 22   CREATININE 0.9 0.9   CALCIUM 9.0 9.1   PROT 7.3 6.8   ALBUMIN 3.2* 3.1*   BILITOT 0.4 0.3   ALKPHOS 52* 50*   AST 29 28   ALT 20 26   ANIONGAP 11 8   EGFRNONAA >60.0 >60.0       Significant Imaging: None       Assessment/Plan:      * COVID-19 virus infection  Mr. Beck is an 79 year-old male with COPD, HTN, DM2 [place] [facility] [place] He presented 10/05 with AMS and cough and tested positive for COVID-19.   -He was septic (met 3/4 SIRS criteria) with hyponatremia (128), an elevated lactate (2.0 on admission which  trended down to 1.4), and an TASHA (1.4 on admission, uncertain of baseline)     -CXR read from OSH records: low-grade nonspecific basal pulmonary interstitial changes. No consolidation, mass, pleural effusion, or pneumothorax.     -Per paper chart review, he was started on CAP coverage with cefepime and azithromycin. He additionally received convalescent plasma and was started on dexamethasone and remdesivir     - TTE in the setting of elevated BNP, reveals EF 55%, no diastolic dysfunction, and intermediate CVP, and mild tricuspid regurg     -Pt finished his course of remdesivir and ceftriaxone     Plan:  -Airborne and droplet precautions  -Continue dexamethasone, end date 10/15  -Cultures-NGTD  -Pt on enoxaprin       GERD (gastroesophageal reflux disease)  Continue home pantoprazole 40 mg       HTN (hypertension)  On HCTZ at home. Was held om admission in the setting of sepsis    Plan:  -Resume when clinically indicated      COPD (chronic obstructive pulmonary disease)  No PFTs on hospital transfer record.   -On home duonebs as needed   -Bl expiratory wheezing on exam    Plan:  -Schedule duonebs q6hrs      Type 2 diabetes mellitus  On metformin 1000mg bid at home.   Unclear if insulin naive or not?     Plan:  -On SSI and will adjust insulin based on SS requirements  -POCT bg tid wm and qHS        VTE Risk Mitigation (From admission, onward)         Ordered     enoxaparin injection 40 mg  Every 24 hours      10/08/20 1353     IP VTE HIGH RISK PATIENT  Once      10/08/20 0421     Place sequential compression device  Until discontinued      10/08/20 0421                Discharge Planning   KELLI: 10/12/2020     Code Status: Full Code   Is the patient medically ready for discharge?:     Reason for patient still in hospital (select all that apply): Treatment  Discharge Plan A: Home   Discharge Delays: None known at this time              Baldemar Louis MD  Department of Hospital Medicine   [facility] - ICU 15  [facility]

## 2020-10-11 NOTE — NURSING
0710: updates from alexandre WHITTINGTON. Pt currently on 10L HFNC. Pt up in chair. No s/s of distress. Continue to monitor.   0820: pt weaned down to 3.5-4L HFNC. MD at bedside  0940: granddaughter updated on plan of care via telephone.   1300: orders received. Pt switched from HFNC at 3L to facemask 12L at 35%.   1910: report given to oncoming nurse

## 2020-10-11 NOTE — SUBJECTIVE & OBJECTIVE
Interval History: NAEON. Pt was comfortable in his chair on assessment today.     Review of Systems   Unable to perform ROS: Other   Pt is extremely hard of hearing       Objective:     Vital Signs (Most Recent):  Temp: 98.1 °F (36.7 °C) (10/11/20 1223)  Pulse: 71 (10/11/20 1333)  Resp: 18 (10/11/20 1333)  BP: (!) 158/79 (10/11/20 1223)  SpO2: (!) 92 % (10/11/20 1333) Vital Signs (24h Range):  Temp:  [97.8 °F (36.6 °C)-98.2 °F (36.8 °C)] 98.1 °F (36.7 °C)  Pulse:  [57-81] 71  Resp:  [18-20] 18  SpO2:  [87 %-97 %] 92 %  BP: (142-162)/(64-83) 158/79     Weight: 101.8 kg (224 lb 6.9 oz)  Body mass index is 34.12 kg/m².    Intake/Output Summary (Last 24 hours) at 10/11/2020 1431  Last data filed at 10/11/2020 1300  Gross per 24 hour   Intake 1290 ml   Output 2035 ml   Net -745 ml      Physical Exam  Vitals signs and nursing note reviewed.   Constitutional:       General: He is not in acute distress.     Appearance: Normal appearance. He is obese. He is not ill-appearing, toxic-appearing or diaphoretic.   HENT:      Head: Normocephalic and atraumatic.      Right Ear: External ear normal.      Left Ear: External ear normal.      Nose: Nose normal. No congestion or rhinorrhea.      Mouth/Throat:      Pharynx: Oropharynx is clear. No oropharyngeal exudate or posterior oropharyngeal erythema.   Eyes:      General: No scleral icterus.        Right eye: No discharge.         Left eye: No discharge.   Cardiovascular:      Rate and Rhythm: Normal rate and regular rhythm.      Pulses: Normal pulses.      Heart sounds: Murmur present. No friction rub. No gallop.    Pulmonary:      Effort: Pulmonary effort is normal. No respiratory distress.      Breath sounds: No stridor. Wheezing and rales present. No rhonchi.   Abdominal:      General: Abdomen is flat. Bowel sounds are normal. There is distension.      Palpations: Abdomen is soft.      Tenderness: There is no abdominal tenderness. There is no guarding or rebound.    Musculoskeletal:      Right lower leg: No edema.      Left lower leg: No edema.   Skin:     General: Skin is warm and dry.      Coloration: Skin is not jaundiced or pale.      Findings: No bruising, erythema, lesion or rash.   Neurological:      Mental Status: He is alert.         Significant Labs:   CBC:   Recent Labs   Lab 10/10/20  0405 10/11/20  0357   WBC 6.34 7.57   HGB 15.5 14.8   HCT 48.6 46.4    215     CMP:   Recent Labs   Lab 10/10/20  0405 10/11/20  0357    137   K 4.0 3.9   CL 95 94*   CO2 34* 35*   * 132*   BUN 19 22   CREATININE 0.9 0.9   CALCIUM 9.0 9.1   PROT 7.3 6.8   ALBUMIN 3.2* 3.1*   BILITOT 0.4 0.3   ALKPHOS 52* 50*   AST 29 28   ALT 20 26   ANIONGAP 11 8   EGFRNONAA >60.0 >60.0       Significant Imaging: None     Review of Systems   Unable to perform ROS: Other   Pt is extremely hard of hearing       Objective:     Vital Signs (Most Recent):  Temp: 98.1 °F (36.7 °C) (10/11/20 1223)  Pulse: 71 (10/11/20 1333)  Resp: 18 (10/11/20 1333)  BP: (!) 158/79 (10/11/20 1223)  SpO2: (!) 92 % (10/11/20 1333) Vital Signs (24h Range):  Temp:  [97.8 °F (36.6 °C)-98.2 °F (36.8 °C)] 98.1 °F (36.7 °C)  Pulse:  [57-81] 71  Resp:  [18-20] 18  SpO2:  [87 %-97 %] 92 %  BP: (142-162)/(64-83) 158/79     Weight: 101.8 kg (224 lb 6.9 oz)  Body mass index is 34.12 kg/m².    Intake/Output Summary (Last 24 hours) at 10/11/2020 1431  Last data filed at 10/11/2020 1300  Gross per 24 hour   Intake 1290 ml   Output 2035 ml   Net -745 ml      Physical Exam  Vitals signs and nursing note reviewed.   Constitutional:       General: He is not in acute distress.     Appearance: Normal appearance. He is obese. He is not ill-appearing, toxic-appearing or diaphoretic.   HENT:      Head: Normocephalic and atraumatic.      Right Ear: External ear normal.      Left Ear: External ear normal.      Nose: Nose normal. No congestion or rhinorrhea.      Mouth/Throat:      Pharynx: Oropharynx is clear. No oropharyngeal  exudate or posterior oropharyngeal erythema.   Eyes:      General: No scleral icterus.        Right eye: No discharge.         Left eye: No discharge.   Cardiovascular:      Rate and Rhythm: Normal rate and regular rhythm.      Pulses: Normal pulses.      Heart sounds: Murmur present. No friction rub. No gallop.    Pulmonary:      Effort: Pulmonary effort is normal. No respiratory distress.      Breath sounds: No stridor. No rhonchi.   Abdominal:      General: Abdomen is flat. Bowel sounds are normal. There is distension.      Palpations: Abdomen is soft.      Tenderness: There is no abdominal tenderness. There is no guarding or rebound.   Musculoskeletal:      Right lower leg: No edema.      Left lower leg: No edema.   Skin:     General: Skin is warm and dry.      Coloration: Skin is not jaundiced or pale.      Findings: No bruising, erythema, lesion or rash.   Neurological:      Mental Status: He is alert.         Significant Labs:   CBC:   Recent Labs   Lab 10/10/20  0405 10/11/20  0357   WBC 6.34 7.57   HGB 15.5 14.8   HCT 48.6 46.4    215     CMP:   Recent Labs   Lab 10/10/20  0405 10/11/20  0357    137   K 4.0 3.9   CL 95 94*   CO2 34* 35*   * 132*   BUN 19 22   CREATININE 0.9 0.9   CALCIUM 9.0 9.1   PROT 7.3 6.8   ALBUMIN 3.2* 3.1*   BILITOT 0.4 0.3   ALKPHOS 52* 50*   AST 29 28   ALT 20 26   ANIONGAP 11 8   EGFRNONAA >60.0 >60.0       Significant Imaging: None

## 2020-10-12 PROBLEM — J96.01 ACUTE RESPIRATORY FAILURE WITH HYPOXIA: Status: ACTIVE | Noted: 2020-10-12

## 2020-10-12 LAB
ALBUMIN SERPL BCP-MCNC: 3.1 G/DL (ref 3.5–5.2)
ALP SERPL-CCNC: 47 U/L (ref 55–135)
ALT SERPL W/O P-5'-P-CCNC: 25 U/L (ref 10–44)
ANION GAP SERPL CALC-SCNC: 8 MMOL/L (ref 8–16)
ANISOCYTOSIS BLD QL SMEAR: SLIGHT
AST SERPL-CCNC: 20 U/L (ref 10–40)
BASOPHILS # BLD AUTO: ABNORMAL K/UL (ref 0–0.2)
BASOPHILS NFR BLD: 0 % (ref 0–1.9)
BILIRUB SERPL-MCNC: 0.4 MG/DL (ref 0.1–1)
BUN SERPL-MCNC: 28 MG/DL (ref 8–23)
CALCIUM SERPL-MCNC: 9.2 MG/DL (ref 8.7–10.5)
CHLORIDE SERPL-SCNC: 94 MMOL/L (ref 95–110)
CO2 SERPL-SCNC: 34 MMOL/L (ref 23–29)
CREAT SERPL-MCNC: 0.9 MG/DL (ref 0.5–1.4)
CRP SERPL-MCNC: 9.8 MG/L (ref 0–8.2)
D DIMER PPP IA.FEU-MCNC: 0.91 MG/L FEU
DIFFERENTIAL METHOD: ABNORMAL
EOSINOPHIL # BLD AUTO: ABNORMAL K/UL (ref 0–0.5)
EOSINOPHIL NFR BLD: 0 % (ref 0–8)
ERYTHROCYTE [DISTWIDTH] IN BLOOD BY AUTOMATED COUNT: 14.6 % (ref 11.5–14.5)
EST. GFR  (AFRICAN AMERICAN): >60 ML/MIN/1.73 M^2
EST. GFR  (NON AFRICAN AMERICAN): >60 ML/MIN/1.73 M^2
FERRITIN SERPL-MCNC: 173 NG/ML (ref 20–300)
GIANT PLATELETS BLD QL SMEAR: PRESENT
GLUCOSE SERPL-MCNC: 137 MG/DL (ref 70–110)
HCT VFR BLD AUTO: 49.1 % (ref 40–54)
HGB BLD-MCNC: 15.5 G/DL (ref 14–18)
HYPOCHROMIA BLD QL SMEAR: ABNORMAL
IMM GRANULOCYTES # BLD AUTO: ABNORMAL K/UL (ref 0–0.04)
IMM GRANULOCYTES NFR BLD AUTO: ABNORMAL % (ref 0–0.5)
LDH SERPL L TO P-CCNC: 267 U/L (ref 110–260)
LYMPHOCYTES # BLD AUTO: ABNORMAL K/UL (ref 1–4.8)
LYMPHOCYTES NFR BLD: 12 % (ref 18–48)
MAGNESIUM SERPL-MCNC: 1.7 MG/DL (ref 1.6–2.6)
MCH RBC QN AUTO: 27.8 PG (ref 27–31)
MCHC RBC AUTO-ENTMCNC: 31.6 G/DL (ref 32–36)
MCV RBC AUTO: 88 FL (ref 82–98)
METAMYELOCYTES NFR BLD MANUAL: 1 %
MONOCYTES # BLD AUTO: ABNORMAL K/UL (ref 0.3–1)
MONOCYTES NFR BLD: 8 % (ref 4–15)
NEUTROPHILS NFR BLD: 76 % (ref 38–73)
NEUTS BAND NFR BLD MANUAL: 3 %
NRBC BLD-RTO: 0 /100 WBC
OVALOCYTES BLD QL SMEAR: ABNORMAL
PHOSPHATE SERPL-MCNC: 3 MG/DL (ref 2.7–4.5)
PLATELET # BLD AUTO: 260 K/UL (ref 150–350)
PMV BLD AUTO: 9.3 FL (ref 9.2–12.9)
POCT GLUCOSE: 134 MG/DL (ref 70–110)
POCT GLUCOSE: 136 MG/DL (ref 70–110)
POCT GLUCOSE: 151 MG/DL (ref 70–110)
POCT GLUCOSE: 166 MG/DL (ref 70–110)
POCT GLUCOSE: 175 MG/DL (ref 70–110)
POIKILOCYTOSIS BLD QL SMEAR: SLIGHT
POLYCHROMASIA BLD QL SMEAR: ABNORMAL
POTASSIUM SERPL-SCNC: 4.1 MMOL/L (ref 3.5–5.1)
PROT SERPL-MCNC: 6.9 G/DL (ref 6–8.4)
RBC # BLD AUTO: 5.58 M/UL (ref 4.6–6.2)
SODIUM SERPL-SCNC: 136 MMOL/L (ref 136–145)
WBC # BLD AUTO: 7.64 K/UL (ref 3.9–12.7)

## 2020-10-12 PROCEDURE — 63700000 PHARM REV CODE 250 ALT 637 W/O HCPCS: Performed by: INTERNAL MEDICINE

## 2020-10-12 PROCEDURE — 63600175 PHARM REV CODE 636 W HCPCS: Performed by: STUDENT IN AN ORGANIZED HEALTH CARE EDUCATION/TRAINING PROGRAM

## 2020-10-12 PROCEDURE — 20600001 HC STEP DOWN PRIVATE ROOM

## 2020-10-12 PROCEDURE — 97110 THERAPEUTIC EXERCISES: CPT

## 2020-10-12 PROCEDURE — 63600175 PHARM REV CODE 636 W HCPCS: Performed by: INTERNAL MEDICINE

## 2020-10-12 PROCEDURE — 85379 FIBRIN DEGRADATION QUANT: CPT

## 2020-10-12 PROCEDURE — 85027 COMPLETE CBC AUTOMATED: CPT

## 2020-10-12 PROCEDURE — 36415 COLL VENOUS BLD VENIPUNCTURE: CPT

## 2020-10-12 PROCEDURE — 99232 SBSQ HOSP IP/OBS MODERATE 35: CPT | Mod: GC,,, | Performed by: INTERNAL MEDICINE

## 2020-10-12 PROCEDURE — 84100 ASSAY OF PHOSPHORUS: CPT

## 2020-10-12 PROCEDURE — 86140 C-REACTIVE PROTEIN: CPT

## 2020-10-12 PROCEDURE — 97116 GAIT TRAINING THERAPY: CPT

## 2020-10-12 PROCEDURE — 85007 BL SMEAR W/DIFF WBC COUNT: CPT

## 2020-10-12 PROCEDURE — 94664 DEMO&/EVAL PT USE INHALER: CPT

## 2020-10-12 PROCEDURE — 99232 PR SUBSEQUENT HOSPITAL CARE,LEVL II: ICD-10-PCS | Mod: GC,,, | Performed by: INTERNAL MEDICINE

## 2020-10-12 PROCEDURE — 25000242 PHARM REV CODE 250 ALT 637 W/ HCPCS: Performed by: HOSPITALIST

## 2020-10-12 PROCEDURE — 80053 COMPREHEN METABOLIC PANEL: CPT

## 2020-10-12 PROCEDURE — 94640 AIRWAY INHALATION TREATMENT: CPT

## 2020-10-12 PROCEDURE — 83735 ASSAY OF MAGNESIUM: CPT

## 2020-10-12 PROCEDURE — 94761 N-INVAS EAR/PLS OXIMETRY MLT: CPT

## 2020-10-12 PROCEDURE — 25000003 PHARM REV CODE 250: Performed by: STUDENT IN AN ORGANIZED HEALTH CARE EDUCATION/TRAINING PROGRAM

## 2020-10-12 PROCEDURE — 27000221 HC OXYGEN, UP TO 24 HOURS

## 2020-10-12 PROCEDURE — 27100171 HC OXYGEN HIGH FLOW UP TO 24 HOURS

## 2020-10-12 PROCEDURE — 82728 ASSAY OF FERRITIN: CPT

## 2020-10-12 PROCEDURE — 97530 THERAPEUTIC ACTIVITIES: CPT

## 2020-10-12 PROCEDURE — 99900035 HC TECH TIME PER 15 MIN (STAT)

## 2020-10-12 PROCEDURE — 83615 LACTATE (LD) (LDH) ENZYME: CPT

## 2020-10-12 PROCEDURE — 97535 SELF CARE MNGMENT TRAINING: CPT

## 2020-10-12 PROCEDURE — 94799 UNLISTED PULMONARY SVC/PX: CPT

## 2020-10-12 RX ORDER — PHENAZOPYRIDINE HYDROCHLORIDE 200 MG/1
200 TABLET, FILM COATED ORAL
Status: DISCONTINUED | OUTPATIENT
Start: 2020-10-12 | End: 2020-10-15 | Stop reason: HOSPADM

## 2020-10-12 RX ORDER — ASCORBIC ACID 500 MG
500 TABLET ORAL 2 TIMES DAILY
Status: DISCONTINUED | OUTPATIENT
Start: 2020-10-12 | End: 2020-10-15 | Stop reason: HOSPADM

## 2020-10-12 RX ORDER — CHOLECALCIFEROL (VITAMIN D3) 25 MCG
1000 TABLET ORAL DAILY
Status: DISCONTINUED | OUTPATIENT
Start: 2020-10-12 | End: 2020-10-15 | Stop reason: HOSPADM

## 2020-10-12 RX ADMIN — IPRATROPIUM BROMIDE AND ALBUTEROL SULFATE 3 ML: .5; 2.5 SOLUTION RESPIRATORY (INHALATION) at 08:10

## 2020-10-12 RX ADMIN — DEXAMETHASONE 6 MG: 4 TABLET ORAL at 09:10

## 2020-10-12 RX ADMIN — ENOXAPARIN SODIUM 40 MG: 40 INJECTION SUBCUTANEOUS at 06:10

## 2020-10-12 RX ADMIN — THERA TABS 1 TABLET: TAB at 02:10

## 2020-10-12 RX ADMIN — PANTOPRAZOLE SODIUM 40 MG: 40 TABLET, DELAYED RELEASE ORAL at 09:10

## 2020-10-12 RX ADMIN — IPRATROPIUM BROMIDE AND ALBUTEROL SULFATE 3 ML: .5; 2.5 SOLUTION RESPIRATORY (INHALATION) at 01:10

## 2020-10-12 RX ADMIN — Medication 1000 UNITS: at 02:10

## 2020-10-12 RX ADMIN — HYDROCHLOROTHIAZIDE 50 MG: 25 TABLET ORAL at 09:10

## 2020-10-12 RX ADMIN — IPRATROPIUM BROMIDE AND ALBUTEROL SULFATE 3 ML: .5; 2.5 SOLUTION RESPIRATORY (INHALATION) at 07:10

## 2020-10-12 RX ADMIN — AZITHROMYCIN MONOHYDRATE 250 MG: 250 TABLET ORAL at 09:10

## 2020-10-12 RX ADMIN — PHENAZOPYRIDINE HYDROCHLORIDE 200 MG: 200 TABLET ORAL at 10:10

## 2020-10-12 RX ADMIN — OXYCODONE HYDROCHLORIDE AND ACETAMINOPHEN 500 MG: 500 TABLET ORAL at 10:10

## 2020-10-12 NOTE — PLAN OF CARE
Problem: Occupational Therapy Goal  Goal: Occupational Therapy Goal  Description: Goals to be met by: 10/22/20     Patient will increase functional independence with ADLs by performing:    Feeding with Modified [place].  UE Dressing with Supervision.  LE Dressing with Stand-by Assistance.  Grooming while standing at sink with Stand-by Assistance.  Toileting from toilet with Stand-by Assistance for hygiene and clothing management.   Toilet transfer to toilet with Stand-by Assistance.    Outcome: Ongoing, Progressing   The above goals remain appropriate. BRYAN Hinojosa  10/12/2020

## 2020-10-12 NOTE — SUBJECTIVE & OBJECTIVE
Interval History: NAEON. Difficult to talk to patient due to severe hearing difficulties. Patient adamant about not wanting to stay in the hospital despite our best efforts to inform that his oxygen requirements are too high for safe discharge right now. Informed we will talk to the grand daughter Lucille.    Review of Systems   Unable to perform ROS: Other (Unable to hear properly)     Objective:     Vital Signs (Most Recent):  Temp: 98.2 °F (36.8 °C) (10/12/20 1605)  Pulse: 78 (10/12/20 1548)  Resp: (!) 22 (10/12/20 1605)  BP: (!) 148/74 (10/12/20 0732)  SpO2: (!) 93 % (10/12/20 1624) Vital Signs (24h Range):  Temp:  [97.8 °F (36.6 °C)-98.2 °F (36.8 °C)] 98.2 °F (36.8 °C)  Pulse:  [62-80] 78  Resp:  [18-22] 22  SpO2:  [90 %-97 %] 93 %  BP: (142-148)/(68-79) 148/74     Weight: 101.8 kg (224 lb 6.9 oz)  Body mass index is 34.12 kg/m².    Intake/Output Summary (Last 24 hours) at 10/12/2020 1842  Last data filed at 10/12/2020 1149  Gross per 24 hour   Intake 60 ml   Output 900 ml   Net -840 ml      Physical Exam  Vitals signs and nursing note reviewed.   Constitutional:       General: He is not in acute distress.     Appearance: Normal appearance. He is obese. He is not ill-appearing, toxic-appearing or diaphoretic.   HENT:      Head: Normocephalic and atraumatic.      Right Ear: External ear normal.      Left Ear: External ear normal.      Nose: Nose normal. No congestion or rhinorrhea.      Mouth/Throat:      Pharynx: Oropharynx is clear. No oropharyngeal exudate or posterior oropharyngeal erythema.   Eyes:      General: No scleral icterus.        Right eye: No discharge.         Left eye: No discharge.   Cardiovascular:      Rate and Rhythm: Normal rate and regular rhythm.      Pulses: Normal pulses.      Heart sounds: Murmur present. No friction rub. No gallop.    Pulmonary:      Effort: Pulmonary effort is normal. No respiratory distress.      Breath sounds: No stridor. No rhonchi.   Abdominal:      General:  Abdomen is flat. Bowel sounds are normal. There is no distension.      Palpations: Abdomen is soft.      Tenderness: There is no abdominal tenderness. There is no guarding or rebound.   Musculoskeletal:      Right lower leg: No edema.      Left lower leg: No edema.   Skin:     General: Skin is warm and dry.      Coloration: Skin is not jaundiced or pale.      Findings: No bruising, erythema, lesion or rash.   Neurological:      Mental Status: He is alert.         Significant Labs: All pertinent labs within the past 24 hours have been reviewed.    Significant Imaging: I have reviewed all pertinent imaging results/findings within the past 24 hours.

## 2020-10-12 NOTE — ASSESSMENT & PLAN NOTE
On HCTZ at home. Was held om admission in the setting of sepsis    Plan:  -Resumed     [facility]

## 2020-10-12 NOTE — ASSESSMENT & PLAN NOTE
No PFTs on hospital transfer record.   -On home duonebs as needed   -Bl expiratory wheezing on exam    Plan:  -Schedule alb nebs q6hrs

## 2020-10-12 NOTE — PHYSICIAN QUERY
PT Name: Femi Beck  MR #: [id number]     RESPIRATORY CONDITION CLARIFICATION     CDS/: Joann Menchaca RN CCDS              Contact information:[email or web address]  This form is a permanent document in the medical record.     Query Date: October 12, 2020    By submitting this query, we are merely seeking further clarification of documentation.  Please utilize your independent clinical judgment when addressing the question(s) below.  The Medical Record contains the following   Indicators   Supporting Clinical Findings Location in Medical Record    SOB, FLORENTINO, Wheezing, Productive Cough, Use of Accessory Muscles, etc.     x RR         ABGs         O2 sat 88-97% on 15L NRB to 7L/HFNC  16-24 RR Nursing flow sheets    Hypoxia/Hypercapnia      BiPAP/Intubation/Mechanical Ventilation     x Supplemental O2 10 L/NC to 15 L NRB Nursing flow sheets    Home O2, Oxygen Dependence      Respiratory Distress or Failure     x Radiology Findings Normal heart size.  Normal pulmonary vasculature.  In the setting of no prior exams, scattered bilateral interstitial thickening and/or infiltrates.  Findings could relate to fibrosis, atelectasis, infection, or edema.  Additional more confluent patchy lower lung zone infiltrates, atelectasis, or edema.  No pleural fluid blunting right or left costophrenic sulci.  No pneumothoraces. CXR 10/8   x Acute/Chronic Illness COVID-19 virus infection   Mr. Beck is an 79 year-old male with COPD, HTN, DM2 [place] [facility], [place] He presented 10/05 with AMS and cough and tested positive for COVID-19.   -He was septic (met 3/4 SIRS criteria) with hyponatremia (128), an elevated lactate (2.0 on admission which trended down to 1.4), and an TASHA (1.4 on admission, uncertain of baseline)    H&P    Treatment     x Other COPD (chronic obstructive pulmonary disease)   No PFTs on hospital transfer record.   -On home duonebs as needed    -Bl expiratory wheezing on exam    PN 10/12     Respiratory failure can be acute, chronic or both. It is generally further specified as hypoxic, hypercapnic or both. Lastly, it is important to identify an etiology, if known or suspected.   References:: https://www.acphospitalist.org/archives/2013/10/coding.htm    http://Liiiike/acute-respiratory-failure-know    The noted clinical guidelines are only system guidelines and do not replace the providers clinical judgment.    Provider, please specify diagnosis or diagnoses associated with above clinical findings.     [  x  ] Acute Respiratory Failure with Hypoxia - ABG pO2 < 60 mmHg or O2 sat of <91% on room air and respiratory symptoms documented   [    ] Hypoxia Only   [    ] Other Respiratory Diagnosis (please specify): _________________   [   ] Clinically Undetermined            Please document in your progress notes daily for the duration of treatment until resolved and include in your discharge summary.

## 2020-10-12 NOTE — PLAN OF CARE
Problem: Adult Inpatient Plan of Care  Goal: Plan of Care Review  Outcome: Ongoing, Progressing  Goal: Patient-Specific Goal (Individualization)  Outcome: Ongoing, Progressing  Goal: Absence of Hospital-Acquired Illness or Injury  Outcome: Ongoing, Progressing  Goal: Optimal Comfort and Wellbeing  Outcome: Ongoing, Progressing  Goal: Readiness for Transition of Care  Outcome: Ongoing, Progressing     Patient alert and oriented to self, place and situation but disoriented to time. Extremely hard of hearing. No hearing aids at bedside. Denies chest pain, shortness of breath or dizziness. Preferred to sleep in the recliner overnight for sleep. Monitor shows patient in sinus rhythm. Vital signs stable on 10L Venti Mask. Free from falls. Call bell within reach. Rounding in place for comfort and safety. Will continue to monitor.

## 2020-10-12 NOTE — PLAN OF CARE
Discharge Recommendation: SNF.    0 goals met today. PT goals appropriate.    Patient is safe to perform bed <> chair transfer with assist x 1 with nursing staff.    Alice Gamez PT, DPT  10/12/2020  Pager: [phone]        Problem: Physical Therapy Goal  Goal: Physical Therapy Goal  Description: Goals to be met by: 10/18/20     Patient will increase functional independence with mobility by performin. Supine to sit with Modified [place]  2. Sit to supine with Modified [place]  3. Sit to stand transfer with Stand-by Assistance  4. Gait  x 50 feet with Stand-by Assistance using LRAD, if needed.   5. Ascend/descend 2 stairs with bilateral Handrails Stand-by Assistance.     Outcome: Ongoing, Progressing
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
## 2020-10-12 NOTE — PT/OT/SLP PROGRESS
Physical Therapy Co-Treatment    Patient Name:  Femi Beck   MRN:  [id number]  Admitting Diagnosis:  COVID-19 virus infection   Recent Surgery: * No surgery found *    Admit Date: 10/8/2020  Length of Stay: 4 days    Recommendations:     Discharge Recommendations:  nursing facility, skilled   Discharge Equipment Recommendations: other (see comments)(tbd pending progress)   Barriers to discharge: None    Assessment:     Femi Beck is a 79 y.o. male admitted with a medical diagnosis of COVID-19 virus infection.  He presents with the following impairments/functional limitations:  weakness, impaired endurance, impaired self care skills, impaired functional mobilty, gait instability, impaired balance, decreased lower extremity function, decreased safety awareness, pain, impaired cardiopulmonary response to activity. Pt tolerated session well today with increased distance ambulated on this date. Cueing required to increase HAYDEN and increase step length to improve safety with mobility. Pt requires frequent rest breaks to complete activity at this time. Pt will benefit from SNF after discharge from acute services in order to continue to progress pt's strength, endurance, and balance to help pt maximize independence at or near PLOF. Pt will continue to benefit from skilled PT services during this hospital admit to continue to improve transfer ability and efficiency as well as continue to progress pt's ambulation distance and cardiopulmonary endurance to maximize pt's functional independence and return to PLOF.      Rehab Prognosis: Good; patient would benefit from acute skilled PT services to address these deficits and reach maximum level of function.    Recent Surgery: * No surgery found *      Plan:     During this hospitalization, patient to be seen 3 x/week to address the identified rehab impairments via gait training, therapeutic activities, therapeutic exercises, neuromuscular re-education and progress toward the  "following goals:    · Plan of Care Expires:  11/07/20    Subjective     RN notified prior to session. No family/friends present upon PT entrance into room.    Chief Complaint: "Can we exercise today? I need to move around"  Patient/Family Comments/goals: get stronger  Pain/Comfort:  · Pain Rating 1: 0/10  · Pain Rating Post-Intervention 1: 0/10      Objective:     Additional staff present: OT for cotx due to pt's multiple deficits req'ing two skilled therapists to appropriately progress pt's musculoskeletal strength and endurance while appropriately facilitating neuromuscular postural balance and control    Patient found up in chair with: telemetry, pulse ox (continuous), oxygen   Mental Status: Patient is oriented to AxOx4 and follows multistep commands. Patient is Alert and Cooperative during session.    General Precautions: Standard, Cardiac airborne, contact, droplet, fall   Orthopedic Precautions:N/A   Braces: N/A   Body mass index is 34.12 kg/m².  Oxygen Device: VentiMask 15L 40% O2    Outcome Measures:  AM-PAC 6 CLICK MOBILITY  Turning over in bed (including adjusting bedclothes, sheets and blankets)?: 3  Sitting down on and standing up from a chair with arms (e.g., wheelchair, bedside commode, etc.): 3  Moving from lying on back to sitting on the side of the bed?: 3  Moving to and from a bed to a chair (including a wheelchair)?: 3  Need to walk in hospital room?: 3  Climbing 3-5 steps with a railing?: 3  Basic Mobility Total Score: 18     Functional Mobility:    Bed Mobility:   · Pt found/returned to bedside chair    Transfers:   · Sit <> Stand Transfer: contact guard assistance with no assistive device   · Stand <> Sit Transfer: contact guard assistance with no assistive device   · [id number] from EOB    Standing Balance:   Assistance Level Required: Contact Guard Assistance   Patient used: no assistive device    Time: 3 minutes   Comments: Pt only able to tolerate x3 minutes standing due to easy " fatigability and poor endurance. PT req'd HHA x 2 in order to accept external perturbations from OT while working on ADLs and self care. PT focused on activity tolerance and endurance      Gait:  · Patient ambulated: 26 ft   · Patient required: contact guard  · Patient used:  hand-held assist x 2  · Gait Pattern observed: reciprocal gait  · Gait Deviation(s): decreased step length, wide base of support, decreased weight shift, steady gait, shuffle gait and decreased sourav  · Impairments due to: impaired balance, decreased strength and decreased endurance  · all lines remained intact throughout ambulation trial  · Comments: Pt required HHA x 2 throughout for balance but no physical assist required throughout. Cueing to increase HAYDEN and step length for improved gait mechanics for safety provided multiple times throughout. Pt with increased instability throughout.    Education:   Time provided for education, counseling and discussion of health disposition in regards to patient's current status   All questions answered within PT scope of practice and to patient's satisfaction   PT role in POC to address current functional deficits   Pt educated on proper body mechanics, safety techniques, and energy conservation with PT facilitation and cueing throughout session   Call nursing/pct to transfer to chair/use bathroom. Pt stated understanding.   Whiteboard updated with pt's current mobility status documented above   Safe to perform step transfer to/from chair/bedside commode assist x 1 and HHA x 2 w/ nursing/PCT present   Importance of OOB tolerance 8-10 hrs/day to improve lung ventilation and expansion as well as strengthen postural musculature    Patient left up in chair with all lines intact, call button in reach and RN notified.    GOALS:   Multidisciplinary Problems     Physical Therapy Goals        Problem: Physical Therapy Goal    Goal Priority Disciplines Outcome Goal Variances Interventions   Physical  Therapy Goal     PT, PT/OT Ongoing, Progressing     Description: Goals to be met by: 10/18/20     Patient will increase functional independence with mobility by performin. Supine to sit with Modified [place]  2. Sit to supine with Modified [place]  3. Sit to stand transfer with Stand-by Assistance  4. Gait  x 50 feet with Stand-by Assistance using LRAD, if needed.   5. Ascend/descend 2 stairs with bilateral Handrails Stand-by Assistance.                    Time Tracking:     PT Received On: 10/12/20  PT Start Time: 934     PT Stop Time: 957  PT Total Time (min): 23 min       Billable Minutes:   · Gait Training 8 and Therapeutic Activity 15    Treatment Type: Treatment  PT/PTA: PT       Alice Gamez PT, DPT  10/12/2020  Pager: [phone]
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
## 2020-10-12 NOTE — PHYSICIAN QUERY
PT Name: Femi Beck  MR #: [id number]     Documentation Clarification      CDS/: Joann Menchaca RN CCDS              Contact information:[email or web address]    This form is a permanent document in the medical record.     Query Date: October 12, 2020    By submitting this query, we are merely seeking further clarification of documentation. Please utilize your independent clinical judgment when addressing the question(s) below.    The Medical Record reflects the following:    Supporting Clinical Findings Location in Medical Record       He has received cefepime and azithromycin at the OSH along with convalescent plasma and dexamethasone/remdesivir. He has received 3 doses of remdesivir while at the OSH, will attempt consent to complete 5 day course. Information sheet given, but patient hard of hearing so will attempt consent from family.     CXR personally reviewed 10/8 with bilateral infiltrates noted. Patient started on azithromycin and ceftriaxone. Currently on 10L high flow nasal cannula. Continue to monitor          H&P                                                                                      Provider, please provide diagnosis or diagnoses associated with above clinical findings.    [   ] Viral pneumonia related to COVID-19   [  x ] Other (please specify): Patient has viral pneumonia related to COVID but was also treated with antibiotics to cover for bacterial pneumonia   [  ] Clinically undetermined

## 2020-10-12 NOTE — PT/OT/SLP PROGRESS
Occupational Therapy   Treatment    Name: Femi Beck  MRN: [id number]  Admitting Diagnosis:  COVID-19 virus infection       Recommendations:     Discharge Recommendations: nursing facility, skilled  Discharge Equipment Recommendations:  (TBD)  Barriers to discharge:  None    Assessment:     Femi Beck is a 79 y.o. male with a medical diagnosis of COVID-19 virus infection.  Performance deficits affecting function are impaired functional mobilty, weakness, decreased safety awareness, impaired cardiopulmonary response to activity, gait instability, impaired endurance, impaired balance, impaired self care skills.     Rehab Prognosis:  Good; patient would benefit from acute skilled OT services to address these deficits and reach maximum level of function.       Plan:     Patient to be seen 3 x/week to address the above listed problems via therapeutic activities, therapeutic exercises  · Plan of Care Expires: 11/07/20  · Plan of Care Reviewed with: patient    Subjective     Pain/Comfort:  · Pain Rating 1: 0/10  · Pain Rating Post-Intervention 1: 0/10    Objective:     Communicated with: RN prior to session.  Patient found up in chair with telemetry, pulse ox (continuous), oxygen(15 L face mask) upon OT entry to room.    General Precautions: Standard, fall, airborne, contact, droplet   Orthopedic Precautions:    Braces:       Occupational Performance:     Bed Mobility:    · NT     Functional Mobility/Transfers:  · Patient completed Sit <> Stand Transfer with contact guard assistance  with  no assistive device from b/s chair  · Functional Mobility: CGA with B HHA around room with standing rest breaks needed 2* SOB; VSS    Activities of Daily Living:  · Grooming: contact guard assistance up in chair 2* easy fatigability  · Lower Body Dressing: maximal assistance     · Toileting: CGA for pericare in standing    [facility] 6 Click ADL: 16    Treatment & Education:  Pt ed on OT POC  Pt demonstrated CGA for dynamic standing  activities  Pt completed on B UE AROM ex's   Pt ed on pacing activities and rest breaks    Patient left up in chair with all lines intact, call button in reach and RN notifiedEducation:      GOALS:   Multidisciplinary Problems     Occupational Therapy Goals        Problem: Occupational Therapy Goal    Goal Priority Disciplines Outcome Interventions   Occupational Therapy Goal     OT, PT/OT Ongoing, Progressing    Description: Goals to be met by: 10/22/20     Patient will increase functional independence with ADLs by performing:    Feeding with Modified [place].  UE Dressing with Supervision.  LE Dressing with Stand-by Assistance.  Grooming while standing at sink with Stand-by Assistance.  Toileting from toilet with Stand-by Assistance for hygiene and clothing management.   Toilet transfer to toilet with Stand-by Assistance.                     Time Tracking:     OT Date of Treatment: 10/12/20  OT Start Time: 0934  OT Stop Time: 0957  OT Total Time (min): 23 min    Billable Minutes:Self Care/Home Management 8  Therapeutic Exercise 15    BRYAN Hinojosa  10/12/2020

## 2020-10-13 LAB
ALBUMIN SERPL BCP-MCNC: 3.1 G/DL (ref 3.5–5.2)
ALP SERPL-CCNC: 52 U/L (ref 55–135)
ALT SERPL W/O P-5'-P-CCNC: 23 U/L (ref 10–44)
ANION GAP SERPL CALC-SCNC: 10 MMOL/L (ref 8–16)
AST SERPL-CCNC: 18 U/L (ref 10–40)
BACTERIA BLD CULT: NORMAL
BACTERIA BLD CULT: NORMAL
BILIRUB SERPL-MCNC: 0.4 MG/DL (ref 0.1–1)
BUN SERPL-MCNC: 32 MG/DL (ref 8–23)
CALCIUM SERPL-MCNC: 9.2 MG/DL (ref 8.7–10.5)
CHLORIDE SERPL-SCNC: 93 MMOL/L (ref 95–110)
CO2 SERPL-SCNC: 30 MMOL/L (ref 23–29)
CREAT SERPL-MCNC: 1.1 MG/DL (ref 0.5–1.4)
EST. GFR  (AFRICAN AMERICAN): >60 ML/MIN/1.73 M^2
EST. GFR  (NON AFRICAN AMERICAN): >60 ML/MIN/1.73 M^2
GLUCOSE SERPL-MCNC: 177 MG/DL (ref 70–110)
MAGNESIUM SERPL-MCNC: 1.8 MG/DL (ref 1.6–2.6)
PHOSPHATE SERPL-MCNC: 3.4 MG/DL (ref 2.7–4.5)
POCT GLUCOSE: 123 MG/DL (ref 70–110)
POCT GLUCOSE: 156 MG/DL (ref 70–110)
POCT GLUCOSE: 184 MG/DL (ref 70–110)
POCT GLUCOSE: 218 MG/DL (ref 70–110)
POTASSIUM SERPL-SCNC: 4 MMOL/L (ref 3.5–5.1)
PROT SERPL-MCNC: 6.9 G/DL (ref 6–8.4)
SODIUM SERPL-SCNC: 133 MMOL/L (ref 136–145)

## 2020-10-13 PROCEDURE — 83735 ASSAY OF MAGNESIUM: CPT

## 2020-10-13 PROCEDURE — 99232 PR SUBSEQUENT HOSPITAL CARE,LEVL II: ICD-10-PCS | Mod: GC,,, | Performed by: INTERNAL MEDICINE

## 2020-10-13 PROCEDURE — 99900035 HC TECH TIME PER 15 MIN (STAT)

## 2020-10-13 PROCEDURE — 25000003 PHARM REV CODE 250: Performed by: STUDENT IN AN ORGANIZED HEALTH CARE EDUCATION/TRAINING PROGRAM

## 2020-10-13 PROCEDURE — 20600001 HC STEP DOWN PRIVATE ROOM

## 2020-10-13 PROCEDURE — 94664 DEMO&/EVAL PT USE INHALER: CPT

## 2020-10-13 PROCEDURE — 63600175 PHARM REV CODE 636 W HCPCS: Performed by: STUDENT IN AN ORGANIZED HEALTH CARE EDUCATION/TRAINING PROGRAM

## 2020-10-13 PROCEDURE — 92526 ORAL FUNCTION THERAPY: CPT

## 2020-10-13 PROCEDURE — 27000646 HC AEROBIKA DEVICE

## 2020-10-13 PROCEDURE — 80053 COMPREHEN METABOLIC PANEL: CPT

## 2020-10-13 PROCEDURE — 94667 MNPJ CHEST WALL 1ST: CPT

## 2020-10-13 PROCEDURE — 36415 COLL VENOUS BLD VENIPUNCTURE: CPT

## 2020-10-13 PROCEDURE — 27000221 HC OXYGEN, UP TO 24 HOURS

## 2020-10-13 PROCEDURE — 25000242 PHARM REV CODE 250 ALT 637 W/ HCPCS: Performed by: HOSPITALIST

## 2020-10-13 PROCEDURE — 27100171 HC OXYGEN HIGH FLOW UP TO 24 HOURS

## 2020-10-13 PROCEDURE — 84100 ASSAY OF PHOSPHORUS: CPT

## 2020-10-13 PROCEDURE — 94761 N-INVAS EAR/PLS OXIMETRY MLT: CPT

## 2020-10-13 PROCEDURE — 99232 SBSQ HOSP IP/OBS MODERATE 35: CPT | Mod: GC,,, | Performed by: INTERNAL MEDICINE

## 2020-10-13 PROCEDURE — 63600175 PHARM REV CODE 636 W HCPCS: Performed by: INTERNAL MEDICINE

## 2020-10-13 PROCEDURE — 94799 UNLISTED PULMONARY SVC/PX: CPT

## 2020-10-13 PROCEDURE — 94640 AIRWAY INHALATION TREATMENT: CPT

## 2020-10-13 RX ADMIN — OXYCODONE HYDROCHLORIDE AND ACETAMINOPHEN 500 MG: 500 TABLET ORAL at 10:10

## 2020-10-13 RX ADMIN — IPRATROPIUM BROMIDE AND ALBUTEROL SULFATE 3 ML: .5; 2.5 SOLUTION RESPIRATORY (INHALATION) at 01:10

## 2020-10-13 RX ADMIN — INSULIN ASPART 1 UNITS: 100 INJECTION, SOLUTION INTRAVENOUS; SUBCUTANEOUS at 08:10

## 2020-10-13 RX ADMIN — PANTOPRAZOLE SODIUM 40 MG: 40 TABLET, DELAYED RELEASE ORAL at 10:10

## 2020-10-13 RX ADMIN — OXYCODONE HYDROCHLORIDE AND ACETAMINOPHEN 500 MG: 500 TABLET ORAL at 08:10

## 2020-10-13 RX ADMIN — ENOXAPARIN SODIUM 40 MG: 40 INJECTION SUBCUTANEOUS at 04:10

## 2020-10-13 RX ADMIN — PHENAZOPYRIDINE HYDROCHLORIDE 200 MG: 200 TABLET ORAL at 01:10

## 2020-10-13 RX ADMIN — IPRATROPIUM BROMIDE AND ALBUTEROL SULFATE 3 ML: .5; 2.5 SOLUTION RESPIRATORY (INHALATION) at 07:10

## 2020-10-13 RX ADMIN — DEXAMETHASONE 6 MG: 4 TABLET ORAL at 10:10

## 2020-10-13 RX ADMIN — THERA TABS 1 TABLET: TAB at 10:10

## 2020-10-13 RX ADMIN — PHENAZOPYRIDINE HYDROCHLORIDE 200 MG: 200 TABLET ORAL at 06:10

## 2020-10-13 RX ADMIN — PHENAZOPYRIDINE HYDROCHLORIDE 200 MG: 200 TABLET ORAL at 10:10

## 2020-10-13 RX ADMIN — HYDROCHLOROTHIAZIDE 50 MG: 25 TABLET ORAL at 10:10

## 2020-10-13 RX ADMIN — Medication 1000 UNITS: at 10:10

## 2020-10-13 NOTE — PLAN OF CARE
Currently not stable for discharge - weaned from comfort flow to 10L today. Will - REC is for SNF. Will continue to follow    Olivia Ponce RN  Case Management  Ext [phone]       10/13/20 6926   Discharge Reassessment   Assessment Type Discharge Planning Reassessment   Provided patient/caregiver education on the expected discharge date and the discharge plan Yes   Do you have any problems affording any of your prescribed medications? No   Discharge Plan A Skilled Nursing Facility   Discharge Plan B Home;Home Health   DME Needed Upon Discharge  other (see comments)  (TBD)   Patient choice form signed by patient/caregiver Yes   Anticipated Discharge Disposition SNF   Post-Acute Status   Post-Acute Authorization Placement   Post-Acute Placement Status Awaiting Internal Medical Clearance   Discharge Delays None known at this time

## 2020-10-13 NOTE — PLAN OF CARE
Problem: Adult Inpatient Plan of Care  Goal: Plan of Care Review  Outcome: Ongoing, Progressing  Goal: Patient-Specific Goal (Individualization)  Outcome: Ongoing, Progressing  Goal: Absence of Hospital-Acquired Illness or Injury  Outcome: Ongoing, Progressing  Goal: Optimal Comfort and Wellbeing  Outcome: Ongoing, Progressing  Goal: Readiness for Transition of Care  Outcome: Ongoing, Progressing  Goal: Rounds/Family Conference  Outcome: Ongoing, Progressing     Problem: Fall Injury Risk  Goal: Absence of Fall and Fall-Related Injury  Outcome: Ongoing, Progressing     Problem: Skin Injury Risk Increased  Goal: Skin Health and Integrity  Outcome: Ongoing, Progressing     Problem: Diabetes Comorbidity  Goal: Blood Glucose Level Within Desired Range  Outcome: Ongoing, Progressing

## 2020-10-13 NOTE — PLAN OF CARE
Problem: SLP Goal  Goal: SLP Goal  Description: Speech Language Pathology Goals  Goals expected to be met by 10/15    1. Pt will tolerate diet of mechanical soft solids and thin liquids without overt clinical signs of aspiration     10/13/2020 1446 by GERRY Villagran, CCC-SLP  Outcome: Met     Pt tolerating diet.  May consider advancing to dental soft or regular consistencies if pt expressed dissatisfaction with current diet.  No further skilled SLP services warranted at this time.   GERRY Hubbard, CCC-SLP  Speech Language Pathologist  [phone]  10/13/2020

## 2020-10-13 NOTE — PLAN OF CARE
10/13/20 0948   Post-Acute Status   Post-Acute Authorization Placement   Post-Acute Placement Status Referrals Sent     Patient is in need of SNF placement post discharge. SW attempted to contacted patient to discuss dc plan but did not get an answer. OLIVA was able to make contact with patient sven Adkins and she reports that she would like SNF placement in the [facility] due to unable to get placement in the [facility] at this time. OLIVA sent referrals via  and will continue to follow up.    Updated 1:30 pm  SW spoke to patient sven Adkins and she is requesting a referral be sent to [facility]. OLIVA sent referral via [facility] and will follow up. OLIVA completed patient locet with the state awaiting 142.    Shayna Hernadez LMSW  [facility]   [id number]
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
## 2020-10-13 NOTE — PT/OT/SLP PROGRESS
"Speech Language Pathology Treatment/Discharge    Patient Name:  Femi Beck   MRN:  [id number]  Admitting Diagnosis: Acute respiratory failure with hypoxia    Recommendations:                 General Recommendations:  Follow-up not indicated  Diet recommendations:  Mechanical soft(pt able to advance to dental soft or regular solids if he expresses dissatisfaction with current diet), Liquid Diet Level: Thin   Aspiration Precautions: 1 bite/sip at a time, Alternating bites/sips, Avoid talking while eating, HOB to 90 degrees, Meds whole 1 at a time, Monitor for s/s of aspiration, Small bites/sips and Strict aspiration precautions   General Precautions: Standard, airborne, aspiration, contact, droplet, fall  Communication strategies:  Pt is very hard of hearing    Subjective     "Is there any chance of getting up and walking around? I'm laying here till I'm stiff."     Pain/Comfort:  · Pain Rating 1: 0/10    Objective:     Has the patient been evaluated by SLP for swallowing?   Yes  Keep patient NPO? No   Current Respiratory Status: nasal cannula      Pt sitting upright in recliner chair, awake/alert and in NAD.  Pt observed eating 2 saltine crackers and tasking straw sips of water.  Pt remains edentulous as dentures are not present.  Oral clearance was good and no overt s/s of aspiration.  Given lack of dentures at this time, may be more practical to remain on current diet. However, if pt begins to express dissatisfaction with mechanical soft diet, team may advance to dental soft or regular solids without SLP re-consult.  No further skilled SLP services warranted at this time.      Assessment:     Femi Beck is a 79 y.o. male.   Given lack of dentures at this time, may be more practical to remain on current diet. However, if pt begins to express dissatisfaction with mechanical soft diet, team may advance to dental soft or regular solids without SLP re-consult.  No further skilled SLP services warranted at this time. "       Goals:   Multidisciplinary Problems     SLP Goals     Not on file          Multidisciplinary Problems (Resolved)        Problem: SLP Goal    Goal Priority Disciplines Outcome   SLP Goal   (Resolved)     SLP Met   Description: Speech Language Pathology Goals  Goals expected to be met by 10/15    1. Pt will tolerate diet of mechanical soft solids and thin liquids without overt clinical signs of aspiration                      Plan:     ·   · SLP Follow-Up:  No       Discharge recommendations:  (no further skilled SLP services warranted at this time)     Time Tracking:     SLP Treatment Date:   10/13/20  Speech Start Time:  1028  Speech Stop Time:  1036     Speech Total Time (min):  8 min    Billable Minutes: Treatment Swallowing Dysfunction 8    GERRY Hubbard, CCC-SLP  10/13/2020     GERRY Hubbard, CCC-SLP  Speech Language Pathologist  [phone]  10/13/2020

## 2020-10-13 NOTE — CARE UPDATE
Called grand daughter Lucille to update and discuss. Call not answered. Will call again tomorrow.    Robbin Huff PGY-2  Internal Medicine  [address], [phone]

## 2020-10-13 NOTE — CARE UPDATE
Called grand daughter Lucille. Discussed with her everything about current management. Revisited patient's code status. Lucille tells she was not informed what the implications of each type of code status means. Did explain her that given his current condition, going on a breathing machine, if needed, might not be the best strategy. Explained her that prognosis once on a mechanical ventilator are not good. Lucille understands everything. Wishes to maintain full code status. Seems like they had a poor overall understanding in that matter, however, given that she has never discussed it with Mr Beck, she opts to maintain the full code status. Lucille also expresses desire to talk to Mr Beck on a video call tomorrow if that can be arranged. Told her to try and talk to the patient to have him understand his current condition, his oxygen requirements and how it is not safe for being discharged to home currently. Lucille agrees with our plan to continue monitoring and weaning oxygen as seen appropriate. Informed her someone from the team will call them every evening for the updates.    Robbin Huff PGY-2  Internal Medicine  [facility] [address], [phone]

## 2020-10-13 NOTE — SUBJECTIVE & OBJECTIVE
Interval History: NAEON  SpO2>90% on 10-12L HFNC this morning    Review of Systems   Unable to perform ROS: Other (Unable to hear properly)     Objective:     Vital Signs (Most Recent):  Temp: 99 °F (37.2 °C) (10/13/20 1601)  Pulse: 68 (10/13/20 1508)  Resp: 18 (10/13/20 1601)  BP: 131/64 (10/13/20 1601)  SpO2: 95 % (10/13/20 1508) Vital Signs (24h Range):  Temp:  [97.5 °F (36.4 °C)-99 °F (37.2 °C)] 99 °F (37.2 °C)  Pulse:  [] 68  Resp:  [15-20] 18  SpO2:  [86 %-98 %] 95 %  BP: (131-145)/(64-74) 131/64     Weight: 101.8 kg (224 lb 6.9 oz)  Body mass index is 34.12 kg/m².    Intake/Output Summary (Last 24 hours) at 10/13/2020 1755  Last data filed at 10/13/2020 1307  Gross per 24 hour   Intake 780 ml   Output 1350 ml   Net -570 ml      Physical Exam  Vitals signs and nursing note reviewed.   Constitutional:       General: He is not in acute distress.     Appearance: Normal appearance. He is obese. He is not ill-appearing, toxic-appearing or diaphoretic.   HENT:      Head: Normocephalic and atraumatic.      Right Ear: External ear normal.      Left Ear: External ear normal.      Nose: Nose normal. No congestion or rhinorrhea.      Mouth/Throat:      Pharynx: Oropharynx is clear. No oropharyngeal exudate or posterior oropharyngeal erythema.   Eyes:      General: No scleral icterus.        Right eye: No discharge.         Left eye: No discharge.   Cardiovascular:      Rate and Rhythm: Normal rate and regular rhythm.      Pulses: Normal pulses.      Heart sounds: Murmur present. No friction rub. No gallop.    Pulmonary:      Effort: Pulmonary effort is normal. No respiratory distress.      Breath sounds: No stridor. No rhonchi.      Comments: On 10L HFNC, SpO2 >90%  Abdominal:      General: Abdomen is flat. Bowel sounds are normal. There is no distension.      Palpations: Abdomen is soft.      Tenderness: There is no abdominal tenderness. There is no guarding or rebound.   Musculoskeletal:      Right lower leg: No  edema.      Left lower leg: No edema.   Skin:     General: Skin is warm and dry.      Coloration: Skin is not jaundiced or pale.      Findings: No bruising, erythema, lesion or rash.   Neurological:      Mental Status: He is alert.         Significant Labs: All pertinent labs within the past 24 hours have been reviewed.    Significant Imaging: I have reviewed and interpreted all pertinent imaging results/findings within the past 24 hours.

## 2020-10-13 NOTE — ASSESSMENT & PLAN NOTE
No PFTs on hospital transfer record.   -On home duonebs as needed   -Bl expiratory wheezing on exam    Plan:  -Schedule alb nebs q6hrs     Positive anti-SMA and elevated IgG. However, patient currently is being treated for infection. Cannot explore autoimmune hepatitis at the moment and more importantly cannot treat with steroids/immunosuppresives.

## 2020-10-14 LAB
ALBUMIN SERPL BCP-MCNC: 3 G/DL (ref 3.5–5.2)
ALP SERPL-CCNC: 51 U/L (ref 55–135)
ALT SERPL W/O P-5'-P-CCNC: 21 U/L (ref 10–44)
ANION GAP SERPL CALC-SCNC: 10 MMOL/L (ref 8–16)
AST SERPL-CCNC: 15 U/L (ref 10–40)
BILIRUB SERPL-MCNC: 0.5 MG/DL (ref 0.1–1)
BUN SERPL-MCNC: 35 MG/DL (ref 8–23)
CALCIUM SERPL-MCNC: 9.2 MG/DL (ref 8.7–10.5)
CHLORIDE SERPL-SCNC: 94 MMOL/L (ref 95–110)
CO2 SERPL-SCNC: 31 MMOL/L (ref 23–29)
CREAT SERPL-MCNC: 1.1 MG/DL (ref 0.5–1.4)
CRP SERPL-MCNC: 4.3 MG/L (ref 0–8.2)
D DIMER PPP IA.FEU-MCNC: 0.71 MG/L FEU
EST. GFR  (AFRICAN AMERICAN): >60 ML/MIN/1.73 M^2
EST. GFR  (NON AFRICAN AMERICAN): >60 ML/MIN/1.73 M^2
FERRITIN SERPL-MCNC: 164 NG/ML (ref 20–300)
GLUCOSE SERPL-MCNC: 143 MG/DL (ref 70–110)
LDH SERPL L TO P-CCNC: 224 U/L (ref 110–260)
MAGNESIUM SERPL-MCNC: 1.9 MG/DL (ref 1.6–2.6)
PHOSPHATE SERPL-MCNC: 3.6 MG/DL (ref 2.7–4.5)
POCT GLUCOSE: 136 MG/DL (ref 70–110)
POCT GLUCOSE: 178 MG/DL (ref 70–110)
POCT GLUCOSE: 222 MG/DL (ref 70–110)
POCT GLUCOSE: 235 MG/DL (ref 70–110)
POTASSIUM SERPL-SCNC: 3.8 MMOL/L (ref 3.5–5.1)
PROT SERPL-MCNC: 7 G/DL (ref 6–8.4)
SODIUM SERPL-SCNC: 135 MMOL/L (ref 136–145)

## 2020-10-14 PROCEDURE — 63600175 PHARM REV CODE 636 W HCPCS: Performed by: INTERNAL MEDICINE

## 2020-10-14 PROCEDURE — 94761 N-INVAS EAR/PLS OXIMETRY MLT: CPT

## 2020-10-14 PROCEDURE — 25000242 PHARM REV CODE 250 ALT 637 W/ HCPCS: Performed by: HOSPITALIST

## 2020-10-14 PROCEDURE — 84100 ASSAY OF PHOSPHORUS: CPT

## 2020-10-14 PROCEDURE — 25000003 PHARM REV CODE 250: Performed by: STUDENT IN AN ORGANIZED HEALTH CARE EDUCATION/TRAINING PROGRAM

## 2020-10-14 PROCEDURE — 94664 DEMO&/EVAL PT USE INHALER: CPT

## 2020-10-14 PROCEDURE — 94668 MNPJ CHEST WALL SBSQ: CPT

## 2020-10-14 PROCEDURE — 63600175 PHARM REV CODE 636 W HCPCS: Performed by: STUDENT IN AN ORGANIZED HEALTH CARE EDUCATION/TRAINING PROGRAM

## 2020-10-14 PROCEDURE — 94640 AIRWAY INHALATION TREATMENT: CPT

## 2020-10-14 PROCEDURE — 99900035 HC TECH TIME PER 15 MIN (STAT)

## 2020-10-14 PROCEDURE — 86140 C-REACTIVE PROTEIN: CPT

## 2020-10-14 PROCEDURE — 82728 ASSAY OF FERRITIN: CPT

## 2020-10-14 PROCEDURE — 85379 FIBRIN DEGRADATION QUANT: CPT

## 2020-10-14 PROCEDURE — 80053 COMPREHEN METABOLIC PANEL: CPT

## 2020-10-14 PROCEDURE — 27000221 HC OXYGEN, UP TO 24 HOURS

## 2020-10-14 PROCEDURE — 83735 ASSAY OF MAGNESIUM: CPT

## 2020-10-14 PROCEDURE — 97535 SELF CARE MNGMENT TRAINING: CPT

## 2020-10-14 PROCEDURE — 99232 SBSQ HOSP IP/OBS MODERATE 35: CPT | Mod: GC,,, | Performed by: INTERNAL MEDICINE

## 2020-10-14 PROCEDURE — 20600001 HC STEP DOWN PRIVATE ROOM

## 2020-10-14 PROCEDURE — 83615 LACTATE (LD) (LDH) ENZYME: CPT

## 2020-10-14 PROCEDURE — 99232 PR SUBSEQUENT HOSPITAL CARE,LEVL II: ICD-10-PCS | Mod: GC,,, | Performed by: INTERNAL MEDICINE

## 2020-10-14 PROCEDURE — 97116 GAIT TRAINING THERAPY: CPT

## 2020-10-14 RX ADMIN — IPRATROPIUM BROMIDE AND ALBUTEROL SULFATE 3 ML: .5; 2.5 SOLUTION RESPIRATORY (INHALATION) at 01:10

## 2020-10-14 RX ADMIN — THERA TABS 1 TABLET: TAB at 09:10

## 2020-10-14 RX ADMIN — OXYCODONE HYDROCHLORIDE AND ACETAMINOPHEN 500 MG: 500 TABLET ORAL at 08:10

## 2020-10-14 RX ADMIN — PHENAZOPYRIDINE HYDROCHLORIDE 200 MG: 200 TABLET ORAL at 05:10

## 2020-10-14 RX ADMIN — PHENAZOPYRIDINE HYDROCHLORIDE 200 MG: 200 TABLET ORAL at 11:10

## 2020-10-14 RX ADMIN — OXYCODONE HYDROCHLORIDE AND ACETAMINOPHEN 500 MG: 500 TABLET ORAL at 09:10

## 2020-10-14 RX ADMIN — IPRATROPIUM BROMIDE AND ALBUTEROL SULFATE 3 ML: .5; 2.5 SOLUTION RESPIRATORY (INHALATION) at 07:10

## 2020-10-14 RX ADMIN — ENOXAPARIN SODIUM 40 MG: 40 INJECTION SUBCUTANEOUS at 05:10

## 2020-10-14 RX ADMIN — DEXAMETHASONE 6 MG: 4 TABLET ORAL at 09:10

## 2020-10-14 RX ADMIN — INSULIN ASPART 2 UNITS: 100 INJECTION, SOLUTION INTRAVENOUS; SUBCUTANEOUS at 05:10

## 2020-10-14 RX ADMIN — PANTOPRAZOLE SODIUM 40 MG: 40 TABLET, DELAYED RELEASE ORAL at 09:10

## 2020-10-14 RX ADMIN — Medication 1000 UNITS: at 09:10

## 2020-10-14 RX ADMIN — IPRATROPIUM BROMIDE AND ALBUTEROL SULFATE 3 ML: .5; 2.5 SOLUTION RESPIRATORY (INHALATION) at 08:10

## 2020-10-14 RX ADMIN — HYDROCHLOROTHIAZIDE 50 MG: 25 TABLET ORAL at 09:10

## 2020-10-14 RX ADMIN — INSULIN ASPART 1 UNITS: 100 INJECTION, SOLUTION INTRAVENOUS; SUBCUTANEOUS at 08:10

## 2020-10-14 NOTE — PLAN OF CARE
Pt O2 weaned down to 6 LPM overnight, pt maintaining above 90%. Pt continues to state ready to go home and does not want to remain in facility, pt educated on reasons for being in hospital. [facility]en, MD spoke with pt POA yesterday, requesting video call with pt. JASON

## 2020-10-14 NOTE — NURSING
Pt resting in chair, complaints of being stiff, asking to ambulate. No walker available for assistance. Pt requires 2 person assist, advised PT will need to work with pt as pt unable to ambulate without max assist at this time due to weakness. Medications reviewed and given per MAR. WCTM

## 2020-10-14 NOTE — SUBJECTIVE & OBJECTIVE
Interval History: No acute events overnight. Patient is eager to leave the hospital, but his oxygen requirement is still to high.    Review of Systems   Unable to perform ROS: Other (Unable to hear properly)     Objective:     Vital Signs (Most Recent):  Temp: 98.1 °F (36.7 °C) (10/14/20 1142)  Pulse: 68 (10/14/20 1527)  Resp: 18 (10/14/20 1358)  BP: 126/66 (10/14/20 1142)  SpO2: (!) 92 % (10/14/20 1527) Vital Signs (24h Range):  Temp:  [97.4 °F (36.3 °C)-98.1 °F (36.7 °C)] 98.1 °F (36.7 °C)  Pulse:  [65-79] 68  Resp:  [14-20] 18  SpO2:  [88 %-96 %] 92 %  BP: (126-156)/(64-78) 126/66     Weight: 100.4 kg (221 lb 5.5 oz)  Body mass index is 33.65 kg/m².    Intake/Output Summary (Last 24 hours) at 10/14/2020 1627  Last data filed at 10/14/2020 1000  Gross per 24 hour   Intake 360 ml   Output 1200 ml   Net -840 ml      Physical Exam  Vitals signs and nursing note reviewed.   Constitutional:       General: He is not in acute distress.     Appearance: Normal appearance. He is obese. He is not ill-appearing, toxic-appearing or diaphoretic.   HENT:      Head: Normocephalic and atraumatic.      Right Ear: External ear normal.      Left Ear: External ear normal.      Nose: Nose normal. No congestion or rhinorrhea.      Mouth/Throat:      Pharynx: Oropharynx is clear. No oropharyngeal exudate or posterior oropharyngeal erythema.   Eyes:      General: No scleral icterus.        Right eye: No discharge.         Left eye: No discharge.   Cardiovascular:      Rate and Rhythm: Normal rate and regular rhythm.      Pulses: Normal pulses.      Heart sounds: Murmur present. No friction rub. No gallop.    Pulmonary:      Effort: Pulmonary effort is normal. No respiratory distress.      Breath sounds: No stridor. No rhonchi.      Comments: On 5L HFNC, SpO2 >90% in the morning. Oxygen was increased to 7L HFNC this afternoon  Abdominal:      General: Abdomen is flat. Bowel sounds are normal. There is no distension.      Palpations:  Abdomen is soft.      Tenderness: There is no abdominal tenderness. There is no guarding or rebound.   Musculoskeletal:      Right lower leg: No edema.      Left lower leg: No edema.   Skin:     General: Skin is warm and dry.      Coloration: Skin is not jaundiced or pale.      Findings: No bruising, erythema, lesion or rash.   Neurological:      Mental Status: He is alert.       Recent Results (from the past 24 hour(s))   POCT glucose    Collection Time: 10/13/20  8:26 PM   Result Value Ref Range    POCT Glucose 218 (H) 70 - 110 mg/dL   Comprehensive Metabolic Panel (CMP)    Collection Time: 10/14/20  4:46 AM   Result Value Ref Range    Sodium 135 (L) 136 - 145 mmol/L    Potassium 3.8 3.5 - 5.1 mmol/L    Chloride 94 (L) 95 - 110 mmol/L    CO2 31 (H) 23 - 29 mmol/L    Glucose 143 (H) 70 - 110 mg/dL    BUN, Bld 35 (H) 8 - 23 mg/dL    Creatinine 1.1 0.5 - 1.4 mg/dL    Calcium 9.2 8.7 - 10.5 mg/dL    Total Protein 7.0 6.0 - 8.4 g/dL    Albumin 3.0 (L) 3.5 - 5.2 g/dL    Total Bilirubin 0.5 0.1 - 1.0 mg/dL    Alkaline Phosphatase 51 (L) 55 - 135 U/L    AST 15 10 - 40 U/L    ALT 21 10 - 44 U/L    Anion Gap 10 8 - 16 mmol/L    eGFR if African American >60.0 >60 mL/min/1.73 m^2    eGFR if non African American >60.0 >60 mL/min/1.73 m^2   Magnesium    Collection Time: 10/14/20  4:46 AM   Result Value Ref Range    Magnesium 1.9 1.6 - 2.6 mg/dL   Phosphorus    Collection Time: 10/14/20  4:46 AM   Result Value Ref Range    Phosphorus 3.6 2.7 - 4.5 mg/dL   C-Reactive Protein    Collection Time: 10/14/20  4:46 AM   Result Value Ref Range    CRP 4.3 0.0 - 8.2 mg/L   Lactate Dehydrogenase    Collection Time: 10/14/20  4:46 AM   Result Value Ref Range     110 - 260 U/L   Ferritin    Collection Time: 10/14/20  4:46 AM   Result Value Ref Range    Ferritin 164 20.0 - 300.0 ng/mL   D-Dimer, Quantitative    Collection Time: 10/14/20  4:46 AM   Result Value Ref Range    D-Dimer 0.71 (H) <0.50 mg/L FEU   POCT glucose    Collection  Time: 10/14/20  7:36 AM   Result Value Ref Range    POCT Glucose 136 (H) 70 - 110 mg/dL     Microbiology Results (last 7 days)     Procedure Component Value Units Date/Time    Blood culture (site 1) [[id number]] Collected: 10/08/20 0435    Order Status: Completed Specimen: Blood from Peripheral, Left Arm Updated: 10/13/20 1012     Blood Culture, Routine No growth after 5 days.    Narrative:      Site # 1, aerobic and anaerobic    Blood culture (site 2) [[id number]] Collected: 10/08/20 0440    Order Status: Completed Specimen: Blood from Antecubital, Right Arm Updated: 10/13/20 1012     Blood Culture, Routine No growth after 5 days.    Narrative:      Site # 2, aerobic only
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
## 2020-10-14 NOTE — PT/OT/SLP PROGRESS
Occupational Therapy   Treatment    Name: Femi Beck  MRN: [id number]  Admitting Diagnosis:  Acute respiratory failure with hypoxia     *co-treatment with PT  Recommendations:     Discharge Recommendations: nursing facility, skilled  Discharge Equipment Recommendations:  (TBD)  Barriers to discharge:  None    Assessment:     Femi Beck is a 79 y.o. male with a medical diagnosis of Acute respiratory failure with hypoxia.  He presents seated in bedside chair on 6L NC O2 with SpO2 93% at rest upon OT/PT entry. Session focused on ADLs at the sink, toileting, transfers and functional mobility. Pt with SpO2 decreasing to 89% following activity returning to 93% seated with rest at conclusion of session. Performance deficits affecting function are weakness, impaired functional mobilty, impaired self care skills, impaired endurance, impaired coordination, decreased safety awareness, impaired cardiopulmonary response to activity. Pt would benefit from skilled OT services in order to maximize independence with ADLs and facilitate safe discharge. Pt would benefit from SNF upon discharge to return to PLOF and decrease burden of care.     Rehab Prognosis:  Good; patient would benefit from acute skilled OT services to address these deficits and reach maximum level of function.       Plan:     Patient to be seen 3 x/week to address the above listed problems via self-care/home management, therapeutic activities, therapeutic exercises  · Plan of Care Expires: 11/07/20  · Plan of Care Reviewed with: patient    Subjective     Pain/Comfort:  · Pain Rating 1: 0/10    Objective:     Communicated with: RN and PT prior to session.  Patient found up in chair with pulse ox (continuous), telemetry, blood pressure cuff, oxygen, peripheral IV([employer] camera) upon OT entry to room.    General Precautions: Standard, airborne, contact, droplet, fall, hearing impaired   Orthopedic Precautions:N/A   Braces: N/A     Occupational Performance:      Bed Mobility:    · Pt found and returned to bedside chair    Functional Mobility/Transfers:  · Patient completed Sit <> Stand Transfer with contact guard assistance  with  hand-held assist   · Patient completed Toilet Transfer Step Transfer technique with minimum assistance with  grab bars  · Functional Mobility: Pt ambulated bedside chair <> bathroom with CGA and HHA in order to maximize functional activity tolerance and standing balance required for engagement in occupations of choice.  Pt on 6L NC O2.     Activities of Daily Living:  · Grooming: mod A to comb hair standing at sink with assistance provided to posterior; set-up A to perform oral care standing at sink; CGA standing balance for grooming at the sink   · Toileting: maximal assistance posterior hygiene in standing       [facility] 6 Click ADL: 16    Treatment & Education:  -Therapist provided facilitation and instruction of proper body mechanics, energy conservation, and fall prevention strategies during tasks listed above.  -Co-tx with PT performed due to need for education and assistance from two skilled therapy disciplines at pt's current functional level.    -Pt educated on role of OT, POC and goals for therapy  -Pt educated on importance of OOB activities with staff member assistance and sitting OOB majority of the day.   -Pt verbalized understanding. Pt expressed no further concerns/questions  -Whiteboard updated    Patient left up in chair with all lines intact, call button in reach, RN notified and [employer] camera presentEducation:      GOALS:   Multidisciplinary Problems     Occupational Therapy Goals        Problem: Occupational Therapy Goal    Goal Priority Disciplines Outcome Interventions   Occupational Therapy Goal     OT, PT/OT Ongoing, Progressing    Description: Goals to be met by: 10/22/20     Patient will increase functional independence with ADLs by performing:    Feeding with Modified [place].  UE Dressing with Supervision.  NAVI  Dressing with Stand-by Assistance.  Grooming while standing at sink with Stand-by Assistance.  Toileting from toilet with Stand-by Assistance for hygiene and clothing management.   Toilet transfer to toilet with Stand-by Assistance.                     Time Tracking:     OT Date of Treatment: 10/14/20  OT Start Time: 1025  OT Stop Time: 1044  OT Total Time (min): 19 min    Billable Minutes:Self Care/Home Management 19    Yazmin Avendaño, PAT  10/14/2020

## 2020-10-14 NOTE — PLAN OF CARE
10/14/20 1023   Post-Acute Status   Post-Acute Authorization Placement     SW received a call from Vika, admissions coordinator [phone] with [facility] and she reports that patient referral is still being reviewed at this time. OLIVA will continue to follow up with patient/family, medical team, and SNF.    Updated 2:21pm  SW spoke to Vika in admissions coordinator [phone] and she reports that patient is accepted by [facility] an can admit when medically stable.       Shayna Herandez LMSW  [facility]   [phone]

## 2020-10-14 NOTE — PLAN OF CARE
Problem: Occupational Therapy Goal  Goal: Occupational Therapy Goal  Description: Goals to be met by: 10/22/20     Patient will increase functional independence with ADLs by performing:    Feeding with Modified [place].  UE Dressing with Supervision.  LE Dressing with Stand-by Assistance.  Grooming while standing at sink with Stand-by Assistance.  Toileting from toilet with Stand-by Assistance for hygiene and clothing management.   Toilet transfer to toilet with Stand-by Assistance.    Outcome: Ongoing, Progressing     Goals reviewed and remain appropriate, continue POC    BRYAN Beard  10/14/2020   Pager #: [phone]

## 2020-10-14 NOTE — ASSESSMENT & PLAN NOTE
No PFTs on hospital transfer record.   -On home duonebs as needed   -Bl expiratory wheezing on initial exam    Plan:  -Schedule alb nebs q6hrs

## 2020-10-14 NOTE — PT/OT/SLP PROGRESS
Physical Therapy  Co-Treatment with OT    Patient Name:  Femi Beck   MRN:  [id number]    Recommendations:     Discharge Recommendations:  nursing facility, skilled   Discharge Equipment Recommendations: (will determine DME needs closer to discharge)   Barriers to discharge: Decreased caregiver support family may not be able to assist at current functional level.     Assessment:     Femi Beck is a 79 y.o. male admitted with a medical diagnosis of Acute respiratory failure with hypoxia.  He presents with the following impairments/functional limitations:  impaired endurance, impaired functional mobilty, gait instability, impaired balance, decreased coordination, decreased safety awareness, impaired coordination pt tolerated treatment well and will benefit from continued skilled PT 3x/wk to progress physically. Pt will need SNF placement to maximize rehab potential.    Rehab Prognosis: Good; patient would benefit from acute skilled PT services to address these deficits and reach maximum level of function.    Recent Surgery: * No surgery found *      Plan:     During this hospitalization, patient to be seen 3 x/week to address the identified rehab impairments via gait training, therapeutic activities, therapeutic exercises and progress toward the following goals:    · Plan of Care Expires:  11/07/20    Subjective     Chief Complaint: pt stated that he needed to use the toilet   Patient/Family Comments/goals:  To get better and go home.   Pain/Comfort:  · Pain Rating 1: 0/10  · Pain Rating Post-Intervention 1: 0/10      Objective:     Communicated with nurse prior to session.  Patient found up in chair with pulse ox (continuous), telemetry, blood pressure cuff, oxygen(hep lock IV, avaysis camera) upon PT entry to room.     General Precautions: Standard, airborne, contact, droplet, fall, hearing impaired   Orthopedic Precautions:N/A   Braces:       Functional Mobility:  · Transfers:   Pt needed verbal cues for hand  placement and sequencing for functional mobility.   · Sit to Stand:  contact guard assistance with hand-held assist from chair and min assist from toilet. Pt used RW for balance.   ·   · Gait: pt received gait training ~ 24 ft with HHA x 2 and O2 6L  Intact. Pt had short shuffling steps with gait.  pt was CGA standing balance at sink performing ADLS with OT. Pt O2 saturations decreased to 90 % during treatment and increased to 93% with rest period after session.       AM-PAC 6 CLICK MOBILITY  Turning over in bed (including adjusting bedclothes, sheets and blankets)?: 3  Sitting down on and standing up from a chair with arms (e.g., wheelchair, bedside commode, etc.): 3  Moving from lying on back to sitting on the side of the bed?: 2  Moving to and from a bed to a chair (including a wheelchair)?: 3  Need to walk in hospital room?: 3  Climbing 3-5 steps with a railing?: 1  Basic Mobility Total Score: 15       Therapeutic Activities and Exercises:   pt received verbal instructions in PT POC and verbally expressed understanding of such.     Patient left up in chair with all lines intact, call button in reach and RN notified..    GOALS:   Multidisciplinary Problems     Physical Therapy Goals        Problem: Physical Therapy Goal    Goal Priority Disciplines Outcome Goal Variances Interventions   Physical Therapy Goal     PT, PT/OT Ongoing, Progressing     Description: Goals to be met by: 10/28/20     Patient will increase functional independence with mobility by performin. Supine to sit with Modified [place] -not met  2. Sit to supine with Modified [place] -not met  3. Sit to stand transfer with Stand-by Assistance -not met  4. Gait  x 50 feet with Stand-by Assistance using LRAD, if needed. -not met                       Time Tracking:     PT Received On: 10/14/20  PT Start Time: 1025     PT Stop Time: 1043  PT Total Time (min): 18 min     Billable Minutes: Gait Training 18 min    Treatment Type:  Treatment(co-treatment with OT)  PT/PTA: PT     PTA Visit Number: 0     Dary Mejia, PT  10/14/2020

## 2020-10-14 NOTE — PLAN OF CARE
Problem: Physical Therapy Goal  Goal: Physical Therapy Goal  Description: Goals to be met by: 10/28/20     Patient will increase functional independence with mobility by performin. Supine to sit with Modified [place] -not met  2. Sit to supine with Modified [place] -not met  3. Sit to stand transfer with Stand-by Assistance -not met  4. Gait  x 50 feet with Stand-by Assistance using LRAD, if needed. -not met      Outcome: Ongoing, Progressing   Goals updated for time frame and are appropriate. Dary Mejia, PT  10/14/2020

## 2020-10-15 VITALS
HEIGHT: 68 IN | HEART RATE: 79 BPM | BODY MASS INDEX: 33.54 KG/M2 | OXYGEN SATURATION: 91 % | WEIGHT: 221.31 LBS | TEMPERATURE: 98 F | DIASTOLIC BLOOD PRESSURE: 68 MMHG | SYSTOLIC BLOOD PRESSURE: 136 MMHG | RESPIRATION RATE: 20 BRPM

## 2020-10-15 DIAGNOSIS — U07.1 COVID-19 VIRUS DETECTED: ICD-10-CM

## 2020-10-15 LAB
ALBUMIN SERPL BCP-MCNC: 3.1 G/DL (ref 3.5–5.2)
ALP SERPL-CCNC: 49 U/L (ref 55–135)
ALT SERPL W/O P-5'-P-CCNC: 18 U/L (ref 10–44)
ANION GAP SERPL CALC-SCNC: 10 MMOL/L (ref 8–16)
ANISOCYTOSIS BLD QL SMEAR: SLIGHT
AST SERPL-CCNC: 14 U/L (ref 10–40)
BASOPHILS # BLD AUTO: ABNORMAL K/UL (ref 0–0.2)
BASOPHILS NFR BLD: 0 % (ref 0–1.9)
BILIRUB SERPL-MCNC: 0.4 MG/DL (ref 0.1–1)
BUN SERPL-MCNC: 36 MG/DL (ref 8–23)
CALCIUM SERPL-MCNC: 9.3 MG/DL (ref 8.7–10.5)
CHLORIDE SERPL-SCNC: 96 MMOL/L (ref 95–110)
CO2 SERPL-SCNC: 30 MMOL/L (ref 23–29)
CREAT SERPL-MCNC: 1.1 MG/DL (ref 0.5–1.4)
DIFFERENTIAL METHOD: ABNORMAL
EOSINOPHIL # BLD AUTO: ABNORMAL K/UL (ref 0–0.5)
EOSINOPHIL NFR BLD: 0 % (ref 0–8)
ERYTHROCYTE [DISTWIDTH] IN BLOOD BY AUTOMATED COUNT: 14.5 % (ref 11.5–14.5)
EST. GFR  (AFRICAN AMERICAN): >60 ML/MIN/1.73 M^2
EST. GFR  (NON AFRICAN AMERICAN): >60 ML/MIN/1.73 M^2
GLUCOSE SERPL-MCNC: 167 MG/DL (ref 70–110)
HCT VFR BLD AUTO: 46.3 % (ref 40–54)
HGB BLD-MCNC: 15.3 G/DL (ref 14–18)
HYPOCHROMIA BLD QL SMEAR: ABNORMAL
IMM GRANULOCYTES # BLD AUTO: ABNORMAL K/UL (ref 0–0.04)
IMM GRANULOCYTES NFR BLD AUTO: ABNORMAL % (ref 0–0.5)
LYMPHOCYTES # BLD AUTO: ABNORMAL K/UL (ref 1–4.8)
LYMPHOCYTES NFR BLD: 6 % (ref 18–48)
MAGNESIUM SERPL-MCNC: 2 MG/DL (ref 1.6–2.6)
MCH RBC QN AUTO: 28.4 PG (ref 27–31)
MCHC RBC AUTO-ENTMCNC: 33 G/DL (ref 32–36)
MCV RBC AUTO: 86 FL (ref 82–98)
MONOCYTES # BLD AUTO: ABNORMAL K/UL (ref 0.3–1)
MONOCYTES NFR BLD: 5 % (ref 4–15)
MYELOCYTES NFR BLD MANUAL: 1 %
NEUTROPHILS NFR BLD: 87 % (ref 38–73)
NEUTS BAND NFR BLD MANUAL: 1 %
NRBC BLD-RTO: 0 /100 WBC
OVALOCYTES BLD QL SMEAR: ABNORMAL
PHOSPHATE SERPL-MCNC: 3 MG/DL (ref 2.7–4.5)
PLATELET # BLD AUTO: 326 K/UL (ref 150–350)
PMV BLD AUTO: 10.2 FL (ref 9.2–12.9)
POCT GLUCOSE: 141 MG/DL (ref 70–110)
POCT GLUCOSE: 196 MG/DL (ref 70–110)
POCT GLUCOSE: 276 MG/DL (ref 70–110)
POIKILOCYTOSIS BLD QL SMEAR: SLIGHT
POLYCHROMASIA BLD QL SMEAR: ABNORMAL
POTASSIUM SERPL-SCNC: 3.8 MMOL/L (ref 3.5–5.1)
PROT SERPL-MCNC: 6.9 G/DL (ref 6–8.4)
RBC # BLD AUTO: 5.38 M/UL (ref 4.6–6.2)
SARS-COV-2 RNA RESP QL NAA+PROBE: DETECTED
SODIUM SERPL-SCNC: 136 MMOL/L (ref 136–145)
WBC # BLD AUTO: 11.13 K/UL (ref 3.9–12.7)

## 2020-10-15 PROCEDURE — 94761 N-INVAS EAR/PLS OXIMETRY MLT: CPT

## 2020-10-15 PROCEDURE — 25000242 PHARM REV CODE 250 ALT 637 W/ HCPCS: Performed by: HOSPITALIST

## 2020-10-15 PROCEDURE — 25000003 PHARM REV CODE 250: Performed by: STUDENT IN AN ORGANIZED HEALTH CARE EDUCATION/TRAINING PROGRAM

## 2020-10-15 PROCEDURE — 99900035 HC TECH TIME PER 15 MIN (STAT)

## 2020-10-15 PROCEDURE — 83735 ASSAY OF MAGNESIUM: CPT

## 2020-10-15 PROCEDURE — 94640 AIRWAY INHALATION TREATMENT: CPT

## 2020-10-15 PROCEDURE — 99239 HOSP IP/OBS DSCHRG MGMT >30: CPT | Mod: GC,,, | Performed by: HOSPITALIST

## 2020-10-15 PROCEDURE — U0003 INFECTIOUS AGENT DETECTION BY NUCLEIC ACID (DNA OR RNA); SEVERE ACUTE RESPIRATORY SYNDROME CORONAVIRUS 2 (SARS-COV-2) (CORONAVIRUS DISEASE [COVID-19]), AMPLIFIED PROBE TECHNIQUE, MAKING USE OF HIGH THROUGHPUT TECHNOLOGIES AS DESCRIBED BY CMS-2020-01-R: HCPCS

## 2020-10-15 PROCEDURE — 94664 DEMO&/EVAL PT USE INHALER: CPT

## 2020-10-15 PROCEDURE — 94799 UNLISTED PULMONARY SVC/PX: CPT

## 2020-10-15 PROCEDURE — 80053 COMPREHEN METABOLIC PANEL: CPT

## 2020-10-15 PROCEDURE — 27100171 HC OXYGEN HIGH FLOW UP TO 24 HOURS

## 2020-10-15 PROCEDURE — 85007 BL SMEAR W/DIFF WBC COUNT: CPT

## 2020-10-15 PROCEDURE — 84100 ASSAY OF PHOSPHORUS: CPT

## 2020-10-15 PROCEDURE — 63600175 PHARM REV CODE 636 W HCPCS: Performed by: INTERNAL MEDICINE

## 2020-10-15 PROCEDURE — 27000221 HC OXYGEN, UP TO 24 HOURS

## 2020-10-15 PROCEDURE — 36415 COLL VENOUS BLD VENIPUNCTURE: CPT

## 2020-10-15 PROCEDURE — 94668 MNPJ CHEST WALL SBSQ: CPT

## 2020-10-15 PROCEDURE — 85027 COMPLETE CBC AUTOMATED: CPT

## 2020-10-15 PROCEDURE — 99239 PR HOSPITAL DISCHARGE DAY,>30 MIN: ICD-10-PCS | Mod: GC,,, | Performed by: HOSPITALIST

## 2020-10-15 RX ORDER — HYDROCHLOROTHIAZIDE 25 MG/1
50 TABLET ORAL DAILY
Start: 2020-10-15

## 2020-10-15 RX ADMIN — INSULIN ASPART 3 UNITS: 100 INJECTION, SOLUTION INTRAVENOUS; SUBCUTANEOUS at 04:10

## 2020-10-15 RX ADMIN — HYDROCHLOROTHIAZIDE 50 MG: 25 TABLET ORAL at 08:10

## 2020-10-15 RX ADMIN — PANTOPRAZOLE SODIUM 40 MG: 40 TABLET, DELAYED RELEASE ORAL at 08:10

## 2020-10-15 RX ADMIN — PHENAZOPYRIDINE HYDROCHLORIDE 200 MG: 200 TABLET ORAL at 08:10

## 2020-10-15 RX ADMIN — DEXAMETHASONE 6 MG: 4 TABLET ORAL at 08:10

## 2020-10-15 RX ADMIN — THERA TABS 1 TABLET: TAB at 08:10

## 2020-10-15 RX ADMIN — Medication 1000 UNITS: at 08:10

## 2020-10-15 RX ADMIN — OXYCODONE HYDROCHLORIDE AND ACETAMINOPHEN 500 MG: 500 TABLET ORAL at 08:10

## 2020-10-15 RX ADMIN — PHENAZOPYRIDINE HYDROCHLORIDE 200 MG: 200 TABLET ORAL at 04:10

## 2020-10-15 RX ADMIN — IPRATROPIUM BROMIDE AND ALBUTEROL SULFATE 3 ML: .5; 2.5 SOLUTION RESPIRATORY (INHALATION) at 01:10

## 2020-10-15 RX ADMIN — PHENAZOPYRIDINE HYDROCHLORIDE 200 MG: 200 TABLET ORAL at 12:10

## 2020-10-15 RX ADMIN — IPRATROPIUM BROMIDE AND ALBUTEROL SULFATE 3 ML: .5; 2.5 SOLUTION RESPIRATORY (INHALATION) at 07:10

## 2020-10-15 NOTE — SUBJECTIVE & OBJECTIVE
Interval History: No acute overnight. Patient is hard of hearing. Difficult to talk to him.    Review of Systems   Unable to perform ROS: Other (Unable to hear properly)     Objective:     Vital Signs (Most Recent):  Temp: 97.7 °F (36.5 °C) (10/15/20 0752)  Pulse: (!) 57 (10/15/20 0752)  Resp: (!) 22 (10/15/20 0752)  BP: 134/64 (10/15/20 0752)  SpO2: (!) 92 % (10/15/20 0752) Vital Signs (24h Range):  Temp:  [97.7 °F (36.5 °C)-98.3 °F (36.8 °C)] 97.7 °F (36.5 °C)  Pulse:  [57-76] 57  Resp:  [18-22] 22  SpO2:  [90 %-95 %] 92 %  BP: (124-139)/(64-70) 134/64     Weight: 100.4 kg (221 lb 5.5 oz)  Body mass index is 33.65 kg/m².    Intake/Output Summary (Last 24 hours) at 10/15/2020 0828  Last data filed at 10/15/2020 0400  Gross per 24 hour   Intake 800 ml   Output 1550 ml   Net -750 ml      Physical Exam  Constitutional:       General: He is not in acute distress.     Appearance: He is not ill-appearing.   HENT:      Head: Normocephalic and atraumatic.      Nose: Nose normal. No congestion.   Eyes:      General:         Right eye: No discharge.         Left eye: No discharge.      Extraocular Movements: Extraocular movements intact.   Cardiovascular:      Rate and Rhythm: Normal rate and regular rhythm.   Pulmonary:      Effort: Pulmonary effort is normal. No respiratory distress.   Skin:     Coloration: Skin is not jaundiced or pale.      Findings: No bruising or lesion.       Recent Results (from the past 24 hour(s))   POCT glucose    Collection Time: 10/14/20 11:38 AM   Result Value Ref Range    POCT Glucose 178 (H) 70 - 110 mg/dL   POCT glucose    Collection Time: 10/14/20  4:59 PM   Result Value Ref Range    POCT Glucose 222 (H) 70 - 110 mg/dL   POCT glucose    Collection Time: 10/14/20  8:40 PM   Result Value Ref Range    POCT Glucose 235 (H) 70 - 110 mg/dL   Comprehensive Metabolic Panel (CMP)    Collection Time: 10/15/20  4:16 AM   Result Value Ref Range    Sodium 136 136 - 145 mmol/L    Potassium 3.8 3.5 - 5.1  mmol/L    Chloride 96 95 - 110 mmol/L    CO2 30 (H) 23 - 29 mmol/L    Glucose 167 (H) 70 - 110 mg/dL    BUN, Bld 36 (H) 8 - 23 mg/dL    Creatinine 1.1 0.5 - 1.4 mg/dL    Calcium 9.3 8.7 - 10.5 mg/dL    Total Protein 6.9 6.0 - 8.4 g/dL    Albumin 3.1 (L) 3.5 - 5.2 g/dL    Total Bilirubin 0.4 0.1 - 1.0 mg/dL    Alkaline Phosphatase 49 (L) 55 - 135 U/L    AST 14 10 - 40 U/L    ALT 18 10 - 44 U/L    Anion Gap 10 8 - 16 mmol/L    eGFR if African American >60.0 >60 mL/min/1.73 m^2    eGFR if non African American >60.0 >60 mL/min/1.73 m^2   CBC auto differential    Collection Time: 10/15/20  4:16 AM   Result Value Ref Range    WBC 11.13 3.90 - 12.70 K/uL    RBC 5.38 4.60 - 6.20 M/uL    Hemoglobin 15.3 14.0 - 18.0 g/dL    Hematocrit 46.3 40.0 - 54.0 %    Mean Corpuscular Volume 86 82 - 98 fL    Mean Corpuscular Hemoglobin 28.4 27.0 - 31.0 pg    Mean Corpuscular Hemoglobin Conc 33.0 32.0 - 36.0 g/dL    RDW 14.5 11.5 - 14.5 %    Platelets 326 150 - 350 K/uL    MPV 10.2 9.2 - 12.9 fL    Immature Granulocytes CANCELED 0.0 - 0.5 %    Immature Grans (Abs) CANCELED 0.00 - 0.04 K/uL    Lymph # CANCELED 1.0 - 4.8 K/uL    Mono # CANCELED 0.3 - 1.0 K/uL    Eos # CANCELED 0.0 - 0.5 K/uL    Baso # CANCELED 0.00 - 0.20 K/uL    nRBC 0 0 /100 WBC    Gran% 87.0 (H) 38.0 - 73.0 %    Lymph% 6.0 (L) 18.0 - 48.0 %    Mono% 5.0 4.0 - 15.0 %    Eosinophil% 0.0 0.0 - 8.0 %    Basophil% 0.0 0.0 - 1.9 %    Bands 1.0 %    Myelocytes 1.0 %    Aniso Slight     Poik Slight     Poly Occasional     Hypo Occasional     Ovalocytes Occasional     Differential Method Manual    Magnesium    Collection Time: 10/15/20  4:16 AM   Result Value Ref Range    Magnesium 2.0 1.6 - 2.6 mg/dL   Phosphorus    Collection Time: 10/15/20  4:16 AM   Result Value Ref Range    Phosphorus 3.0 2.7 - 4.5 mg/dL   POCT glucose    Collection Time: 10/15/20  7:57 AM   Result Value Ref Range    POCT Glucose 141 (H) 70 - 110 mg/dL     Microbiology Results (last 7 days)     Procedure  Component Value Units Date/Time    Blood culture (site 1) [[id number]] Collected: 10/08/20 0435    Order Status: Completed Specimen: Blood from Peripheral, Left Arm Updated: 10/13/20 1012     Blood Culture, Routine No growth after 5 days.    Narrative:      Site # 1, aerobic and anaerobic    Blood culture (site 2) [[id number]] Collected: 10/08/20 0440    Order Status: Completed Specimen: Blood from Antecubital, Right Arm Updated: 10/13/20 1012     Blood Culture, Routine No growth after 5 days.    Narrative:      Site # 2, aerobic only        Imaging:   CXR 10/8:  FINDINGS:  Normal heart size.  Normal pulmonary vasculature.  In the setting of no prior exams, scattered bilateral interstitial thickening and/or infiltrates.  Findings could relate to fibrosis, atelectasis, infection, or edema.  Additional more confluent patchy lower lung zone infiltrates, atelectasis, or edema.  No pleural fluid blunting right or left costophrenic sulci.  No pneumothoraces.     Impression:     In the setting of no prior exams for comparison, bilateral pulmonary abnormalities as noted.    Significant Labs: All pertinent labs within the past 24 hours have been reviewed.    Significant Imaging: I have reviewed all pertinent imaging results/findings within the past 24 hours.

## 2020-10-15 NOTE — PLAN OF CARE
[facility]     Department of Hospital Medicine     [address] [place] [address]     [phone] after hours  [phone] fax       NURSING HOME ORDERS    10/15/2020    Admit to Nursing Home:       Skilled Bed                                                 Diagnoses:  Active Hospital Problems    Diagnosis  POA    *Acute respiratory failure with hypoxia [J96.01]  Unknown     Priority: 2     COVID-19 virus infection [U07.1]  Yes     Priority: 1 - High    COPD (chronic obstructive pulmonary disease) [J44.9]  Unknown     Priority: 2     Type 2 diabetes mellitus [E11.9]  Unknown     Priority: 3     HTN (hypertension) [I10]  Unknown     Priority: 3     GERD (gastroesophageal reflux disease) [K21.9]  Yes     Priority: 4      Chronic      Resolved Hospital Problems   No resolved problems to display.       Patient is homebound due to:  Acute respiratory failure with hypoxia    Allergies:Review of patient's allergies indicates:  No Known Allergies    Vitals:  Per facility    Diet: mechanical soft    Supplement:  1 can every three times a day with meals                         Type:  House            Acitivities:      - Up in a chair each morning as tolerated   - Ambulate with assistance to bathroom      LABS:  Per facility protocol   CMP, CBC each month for 3 months   TSH every year     Nursing Precautions:         - Fall precautions per nursing home protocol      CONSULTS:   Physical Therapy to evaluate and treat     Occupational Therapy to evaluate and treat       Nutrition to evaluate and recommend diet         MISCELLANEOUS CARE:    O2 orders:   Patient requiring 5L HFNC satting in mid 90s.   Wean O2 as tolerated with a goal of 88-92% SpO2         DIABETES CARE:       Check blood sugar:      Fingerstick blood sugar a.m and p.m.    Fingerstick blood sugar AC and HS   Fingerstick blood sugar every 6 hours if unable to eat      Report CBG < 60 or > 400 to  physician.                                          Insulin Sliding Scale          Glucose  Novolog Insulin Subcutaneous        0 - 60   Orange juice or glucose tablet, hold insulin      No insulin   201-250  2 units   251-300  4 units   301-350  6 units   351-400  8 units   >400   10 units then call physician        Medications: Discontinue all previous medication orders, if any. See new list below.     Femi Beck   Home Medication Instructions [facility]MA:[id number]    Printed on:10/15/20 0903   Medication Information                      aspirin (ECOTRIN) 81 MG EC tablet  Take 81 mg by mouth once daily.             hydroCHLOROthiazide (HYDRODIURIL) 25 MG tablet  Take 2 tablets (50 mg total) by mouth once daily.             metFORMIN (GLUCOPHAGE) 1000 MG tablet  Take 1,000 mg by mouth 2 (two) times daily with meals.             pantoprazole (PROTONIX) 40 MG tablet  Take 40 mg by mouth once daily.                       _________________________________  Augie Ji MD PGY1  10/15/2020

## 2020-10-15 NOTE — NURSING
Report called to Rnchey at [facility]. Pt is aware that he is going to [facility] today. Bag of belongings sent with EMS including pt's watch.   Discharge instructions sent with EMS    Spoke with Lucille, she is aware of transfer.

## 2020-10-15 NOTE — PLAN OF CARE
10/15/20 0939   Post-Acute Status   Post-Acute Authorization Placement     Patient is medically stable to dc today, OLIVA contacted Vika, admissions coordinator and left a voicemail. OLIVA will continue to follow up.          Shayna Hernadez LMSW  [facility]   [id number]
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
## 2020-10-15 NOTE — PT/OT/SLP PROGRESS
Occupational Therapy      Patient Name:  Femi Beck   MRN:  [id number]    Patient not seen today secondary to preparing to transfer to OSH  . Will follow-up 10/16/20 if pt remains at [facility].    BRYAN Hinojosa  10/15/2020

## 2020-10-15 NOTE — PLAN OF CARE
10/15/20 1354   Post-Acute Status   Post-Acute Authorization Placement   Post-Acute Placement Status Set-up Complete     Patient is discharging today to [facility] .  set up transport via [facility] via ambulance with 4 liters of oxygen requested for patient as well. Transport time scheduled for 3:00pm. Nurse please call report to  Mike [phone].      Shayna Hernadez LMSW  [facility]   [phone]

## 2020-10-15 NOTE — PLAN OF CARE
Patient medically ready for discharge to [facility] SWING BED/SNF. Any necessary transport setup by . APPOINTMENTS TO BE SCHEDULED AT TIME OF DISCHARGE FROM SWING BED/SNF. Family/patient aware of discharge.    No future appointments.    Olivia Ponce RN  Case Management  Ext: [phone]  10/15/2020       10/15/20 1516   Final Note   Assessment Type Final Discharge Note   Anticipated Discharge Disposition Swing Bed   What phone number can be called within the next 1-3 days to see how you are doing after discharge? [phone]   Hospital Follow Up  Appt(s) scheduled? No  (WILL BE SCHEDULED UPON DISCHARGE FROM SNF/SWING BED)   Discharge plans and expectations educations in teach back method with documentation complete? Yes  (REPORT CALLED TO FACILITY BY BEDSIDE NURSE / PACKET SENT BY SW)   Right Care Referral Info   Post Acute Recommendation SNF / Sub-Acute Rehab   Referral Type SNF/SWING BED   Facility Name [facility], [place], [place]   Post-Acute Status   Post-Acute Authorization Placement   Post-Acute Placement Status Set-up Complete   Discharge Delays None known at this time

## 2020-10-15 NOTE — PLAN OF CARE
Problem: Adult Inpatient Plan of Care  Goal: Plan of Care Review  Outcome: Ongoing, Progressing  Goal: Patient-Specific Goal (Individualization)  Outcome: Ongoing, Progressing   POC reviewed with patient. Patient very [place]. Questions encouraged and answered. Patient states understanding. POC is ongoing.

## 2020-10-15 NOTE — ASSESSMENT & PLAN NOTE
Mr. Beck is an 79 year-old male with COPD, HTN, DM2 [place] [facility] [place] [facility] ([place], [place]). He presented 10/05 with AMS and cough and tested positive for COVID-19.   -He was septic (met 3/4 SIRS criteria) with hyponatremia (128), an elevated lactate (2.0 on admission which trended down to 1.4), and an TASHA (1.4 on admission, uncertain of baseline)     -CXR read from OSH records: low-grade nonspecific basal pulmonary interstitial changes. No consolidation, mass, pleural effusion, or pneumothorax.     -Per paper chart review, he was started on CAP coverage with cefepime and azithromycin. He additionally received convalescent plasma and was started on dexamethasone and remdesivir     - TTE in the setting of elevated BNP, reveals EF 55%, no diastolic dysfunction, and intermediate CVP, and mild tricuspid regurg     -Pt finished his course of remdesivir and ceftriaxone   - Last dose of dexamethasone 10/15    Plan:  - Wean supplemental oxygen, currently at 7L HFNC  - Airborne and droplet precautions  - Continue dexamethasone, end date 10/15  - Cultures-NGTD  - Pt on enoxaprin  - added vitamins  - Difficult to talk to patient due to severe hearing difficulties. Patient adamant about not wanting to stay in the hospital despite our best efforts to inform that his oxygen requirements are too high for safe discharge right now. Informed we will talk to the grand daughter Lucille.